# Patient Record
Sex: MALE | Race: WHITE | NOT HISPANIC OR LATINO | Employment: OTHER | URBAN - METROPOLITAN AREA
[De-identification: names, ages, dates, MRNs, and addresses within clinical notes are randomized per-mention and may not be internally consistent; named-entity substitution may affect disease eponyms.]

---

## 2023-11-14 ENCOUNTER — APPOINTMENT (OUTPATIENT)
Dept: LAB | Facility: CLINIC | Age: 78
End: 2023-11-14
Payer: MEDICARE

## 2023-11-14 DIAGNOSIS — C90.01 MULTIPLE MYELOMA IN REMISSION (HCC): ICD-10-CM

## 2023-11-14 LAB
ALBUMIN SERPL BCP-MCNC: 4.5 G/DL (ref 3.5–5)
ALP SERPL-CCNC: 71 U/L (ref 34–104)
ALT SERPL W P-5'-P-CCNC: 19 U/L (ref 7–52)
ANION GAP SERPL CALCULATED.3IONS-SCNC: 10 MMOL/L
AST SERPL W P-5'-P-CCNC: 16 U/L (ref 13–39)
BASOPHILS # BLD AUTO: 0.03 THOUSANDS/ÂΜL (ref 0–0.1)
BASOPHILS NFR BLD AUTO: 1 % (ref 0–1)
BILIRUB SERPL-MCNC: 0.59 MG/DL (ref 0.2–1)
BUN SERPL-MCNC: 23 MG/DL (ref 5–25)
CALCIUM SERPL-MCNC: 9.1 MG/DL (ref 8.4–10.2)
CHLORIDE SERPL-SCNC: 106 MMOL/L (ref 96–108)
CO2 SERPL-SCNC: 26 MMOL/L (ref 21–32)
CREAT SERPL-MCNC: 1.19 MG/DL (ref 0.6–1.3)
EOSINOPHIL # BLD AUTO: 0.06 THOUSAND/ÂΜL (ref 0–0.61)
EOSINOPHIL NFR BLD AUTO: 2 % (ref 0–6)
ERYTHROCYTE [DISTWIDTH] IN BLOOD BY AUTOMATED COUNT: 15.6 % (ref 11.6–15.1)
GFR SERPL CREATININE-BSD FRML MDRD: 58 ML/MIN/1.73SQ M
GLUCOSE SERPL-MCNC: 110 MG/DL (ref 65–140)
HCT VFR BLD AUTO: 43.3 % (ref 36.5–49.3)
HGB BLD-MCNC: 14.6 G/DL (ref 12–17)
IGA SERPL-MCNC: 11 MG/DL (ref 66–433)
IGG SERPL-MCNC: 237 MG/DL (ref 635–1741)
IGM SERPL-MCNC: <20 MG/DL (ref 45–281)
IMM GRANULOCYTES # BLD AUTO: 0.02 THOUSAND/UL (ref 0–0.2)
IMM GRANULOCYTES NFR BLD AUTO: 1 % (ref 0–2)
LDH SERPL-CCNC: 180 U/L (ref 140–271)
LYMPHOCYTES # BLD AUTO: 0.18 THOUSANDS/ÂΜL (ref 0.6–4.47)
LYMPHOCYTES NFR BLD AUTO: 6 % (ref 14–44)
MCH RBC QN AUTO: 34.2 PG (ref 26.8–34.3)
MCHC RBC AUTO-ENTMCNC: 33.7 G/DL (ref 31.4–37.4)
MCV RBC AUTO: 101 FL (ref 82–98)
MONOCYTES # BLD AUTO: 0.48 THOUSAND/ÂΜL (ref 0.17–1.22)
MONOCYTES NFR BLD AUTO: 15 % (ref 4–12)
NEUTROPHILS # BLD AUTO: 2.37 THOUSANDS/ÂΜL (ref 1.85–7.62)
NEUTS SEG NFR BLD AUTO: 75 % (ref 43–75)
NRBC BLD AUTO-RTO: 0 /100 WBCS
PLATELET # BLD AUTO: 113 THOUSANDS/UL (ref 149–390)
PMV BLD AUTO: 10.8 FL (ref 8.9–12.7)
POTASSIUM SERPL-SCNC: 4.1 MMOL/L (ref 3.5–5.3)
PROT SERPL-MCNC: 6 G/DL (ref 6.4–8.4)
RBC # BLD AUTO: 4.27 MILLION/UL (ref 3.88–5.62)
SODIUM SERPL-SCNC: 142 MMOL/L (ref 135–147)
URATE SERPL-MCNC: 6.8 MG/DL (ref 3.5–8.5)
WBC # BLD AUTO: 3.14 THOUSAND/UL (ref 4.31–10.16)

## 2023-11-14 PROCEDURE — 82784 ASSAY IGA/IGD/IGG/IGM EACH: CPT

## 2023-11-14 PROCEDURE — 36415 COLL VENOUS BLD VENIPUNCTURE: CPT

## 2023-11-14 PROCEDURE — 83615 LACTATE (LD) (LDH) ENZYME: CPT

## 2023-11-14 PROCEDURE — 83521 IG LIGHT CHAINS FREE EACH: CPT

## 2023-11-14 PROCEDURE — 80053 COMPREHEN METABOLIC PANEL: CPT

## 2023-11-14 PROCEDURE — 84165 PROTEIN E-PHORESIS SERUM: CPT

## 2023-11-14 PROCEDURE — 86334 IMMUNOFIX E-PHORESIS SERUM: CPT

## 2023-11-14 PROCEDURE — 84550 ASSAY OF BLOOD/URIC ACID: CPT

## 2023-11-14 PROCEDURE — 85025 COMPLETE CBC W/AUTO DIFF WBC: CPT

## 2023-11-16 LAB
ALBUMIN SERPL ELPH-MCNC: 4.38 G/DL (ref 3.2–5.1)
ALBUMIN SERPL ELPH-MCNC: 74.2 % (ref 48–70)
ALPHA1 GLOB SERPL ELPH-MCNC: 0.21 G/DL (ref 0.15–0.47)
ALPHA1 GLOB SERPL ELPH-MCNC: 3.5 % (ref 1.8–7)
ALPHA2 GLOB SERPL ELPH-MCNC: 0.48 G/DL (ref 0.42–1.04)
ALPHA2 GLOB SERPL ELPH-MCNC: 8.1 % (ref 5.9–14.9)
BETA GLOB ABNORMAL SERPL ELPH-MCNC: 0.4 G/DL (ref 0.31–0.57)
BETA1 GLOB SERPL ELPH-MCNC: 6.7 % (ref 4.7–7.7)
BETA2 GLOB SERPL ELPH-MCNC: 3.4 % (ref 3.1–7.9)
BETA2+GAMMA GLOB SERPL ELPH-MCNC: 0.2 G/DL (ref 0.2–0.58)
GAMMA GLOB ABNORMAL SERPL ELPH-MCNC: 0.24 G/DL (ref 0.4–1.66)
GAMMA GLOB SERPL ELPH-MCNC: 4.1 % (ref 6.9–22.3)
IGG/ALB SER: 2.88 {RATIO} (ref 1.1–1.8)
INTERPRETATION UR IFE-IMP: NORMAL
PROT PATTERN SERPL ELPH-IMP: ABNORMAL
PROT SERPL-MCNC: 5.9 G/DL (ref 6.4–8.2)

## 2023-11-16 PROCEDURE — 86334 IMMUNOFIX E-PHORESIS SERUM: CPT | Performed by: STUDENT IN AN ORGANIZED HEALTH CARE EDUCATION/TRAINING PROGRAM

## 2023-11-16 PROCEDURE — 84165 PROTEIN E-PHORESIS SERUM: CPT | Performed by: STUDENT IN AN ORGANIZED HEALTH CARE EDUCATION/TRAINING PROGRAM

## 2023-11-22 LAB
KAPPA LC FREE SER-MCNC: <0.7 MG/L (ref 3.3–19.4)
KAPPA LC FREE/LAMBDA FREE SER: ABNORMAL {RATIO}
LAMBDA LC FREE SERPL-MCNC: <1.5 MG/L (ref 5.7–26.3)

## 2024-04-03 ENCOUNTER — EVALUATION (OUTPATIENT)
Dept: PHYSICAL THERAPY | Facility: CLINIC | Age: 79
End: 2024-04-03
Payer: MEDICARE

## 2024-04-03 DIAGNOSIS — S22.008D OTHER FRACTURE OF UNSPECIFIED THORACIC VERTEBRA, SUBSEQUENT ENCOUNTER FOR FRACTURE WITH ROUTINE HEALING: ICD-10-CM

## 2024-04-03 DIAGNOSIS — C90.01 MULTIPLE MYELOMA IN REMISSION (HCC): Primary | ICD-10-CM

## 2024-04-03 PROCEDURE — 97110 THERAPEUTIC EXERCISES: CPT | Performed by: PHYSICAL THERAPIST

## 2024-04-03 PROCEDURE — 97161 PT EVAL LOW COMPLEX 20 MIN: CPT | Performed by: PHYSICAL THERAPIST

## 2024-04-03 NOTE — LETTER
April 3, 2024      No Recipients    Patient: Juve Livingston   YOB: 1945   Date of Visit: 4/3/2024     Encounter Diagnosis     ICD-10-CM    1. Multiple myeloma in remission (HCC)  C90.01       2. Other fracture of unspecified thoracic vertebra, subsequent encounter for fracture with routine healing  S22.008D           Dear Dr. Bull:    Thank you for your recent referral of Juve Livingston. Please review the attached evaluation summary from Juve's recent visit.     Please verify that you agree with the plan of care by signing the attached order.     If you have any questions or concerns, please do not hesitate to call.     I sincerely appreciate the opportunity to share in the care of one of your patients and hope to have another opportunity to work with you in the near future.       Sincerely,    Mary Alice Espinosa, PT      Referring Provider:      I certify that I have read the below Plan of Care and certify the need for these services furnished under this plan of treatment while under my care.                    Christopher Bull MD  [address] [place] [id number]  Via Fax: [phone]          PT Evaluation     Today's date: 4/3/2024  Patient name: Juve Livingston  : 1945  MRN: [id number]  Referring provider: Parmer, Lauren  Dx: No diagnosis found.               Assessment  Assessment details: 4/3/2024: Juve Livingston is a 78 y.o. male who presents 2 years post thoracic fusion with lumbar with pain, decreased strength, decreased ROM, decreased joint mobility, ambulatory dysfunction, postural dysfunction, balance dysfunction, and decreased endurance. Due to these impairments, patient has difficulty performing ADL's, recreational activities, engaging in social activities, ambulation, stair negotiation, lifting/carrying, transfers, reaching. Patient's clinical presentation is consistent with their referring diagnosis of . Patient has been educated in home exercise program and plan of care.  Patient would benefit from skilled physical therapy services to address their aforementioned functional limitations and progress towards prior level of function and independence with home exercise program and self symptom management/resolution.     Impairments: abnormal or restricted ROM, activity intolerance, impaired balance, impaired physical strength, lacks appropriate home exercise program, pain with function, poor posture  and poor body mechanics  Other impairment: poor tolerance to prolonged static positions  Understanding of Dx/Px/POC: good   Prognosis: good    Goals  Short Term Goals:  Target Date ***  1. Initiate and advance HEP toward self symptom reduction/resolution.  2. Improve postural awareness and demonstrate self postural correction.  3. Improve AROM lumbar spine to min robertson or better t/o.  4. Undisturbed sleep.  5. Pt to tolerate prolonged sitting/standing x 1 hour or more w/o c/o.      Long Term Goals:  Target Date ***  1. Indep with HEP and self symptom prevention.  2. Achieve lumbar AROM to WNL w/o c/o.  3. Improve LE/core strength to good to allow pt to tolerate bending and lifting/carrying 20# x 10/1' w/o c/o.      Plan  Patient would benefit from: skilled PT and PT eval  Planned modality interventions: thermotherapy: hydrocollator packs and unattended electrical stimulation  Planned therapy interventions: joint mobilization, patient education, postural training, abdominal trunk stabilization, functional ROM exercises, home exercise program, neuromuscular re-education, strengthening, stretching, therapeutic activities and therapeutic exercise  Other planned therapy interventions: mechanical assessment  Frequency: 2x week  Duration in weeks: 8  Treatment plan discussed with: patient    Subjective Evaluation    History of Present Illness  Mechanism of injury: Subjective 4/3/2024: Pt reports to PT for general deconditioning, imbalance, LE weakness and also reports some back pain. He had a  compression fx at T3 and subsequently had thoracic fusion surgery T2-T6 in 2021. He was sent to rehab, but returned to the hospital due to pneumonia. He was soon after dx w/ melanoma. He underwent treatment for this and is currently in remission. He never completed rehab after his back surgery, citing multiple bouts of pneumonia, Covid and cancer treatments.   He c/o low back pain now, occurs w/ rising from prolonged sitting or standing; better as he walks. No UE or LE symptoms.   He c/o leg crams at night.   Pt has been using rollator walker for ambulation outside of his home since his back surgery. In his home, he ambulates w/o AD and denies holding onto furniture.  Patient Goals  Patient goals for therapy: increased strength, decreased pain, independence with ADLs/IADLs and return to sport/leisure activities  Patient goal: less back pain; stronger legs  Pain  At best pain ratin  At worst pain ratin  Quality: dull ache and tight    Treatments  Current treatment: physical therapy      Objective      POSTURE:  *** standing posture demonstrates *** lumbar lordosis; there is *** lateral shift   Posture correction in stting *** sympotoms    SPECIAL TESTS     ***  SLR supine:   (*** R/*** L)  Crossed SLR:   (*** R/*** L)  Prone instability test:  (***)  Prone hip IR ROM:  (***R/***L)  Aberrant motion/[place]'s: (***)  Supine to sit test:  (***)  [place] test prone:  (***)  Slump test:   (***R/***L)  Femoral NTT:   (***R/***L)      DERMATOMAL TESTING:     ***  L2 anterior thigh:  ***  L3 medial knee:  ***  L4 medial maleolus:  ***  L5 1st web space:  ***  S1 lateral/sole foot:  ***  S2 poster calf:  ***    MYOTOMAL TESTIN/3/2024 4/3/2024     Right  Left  L2-3 Hip flexion:    4+/5  L3-4 Knee extension:    4+/5  L5 Great toe exten:    5/5  L4 Heel walk/DF:    5/5  S1 toe walk/PF/ever:    4/  S2 knee flex:     4  Hip ER   4+/5  4+/5  Hip IR   4+/5  4+/5  Hip abd    45  Hip  exten  3-/5  3+/5    REFLEXES: 0= none; 1+ = slight; 2+ = brisk/normal; 3+= very brisk; 4+= clonus     ***  L3-4 Quadriceps:  ***  L5-S1 Achilles:  ***    SPINAL/PIAVM ASSESSMENT/PALPATION:   ***: Lumbar P to A segmental mobility *** limited     LUMBAR AROM: ***  Flexion   *** robertson  Extension  *** robertson  R sideglide  *** robertson  L sideglide  *** robertson    MECHANICAL ASSESSMENT:   ***: pre-test findings include  Repeated extension in lying (REIL) 2x10 = ***  Repeated flexion in sitting 2x10 = ***    FUNCTION:  ***: Pt is limited with ***    FLEXIBILITY:  ***: Hamstring flexibility tera robertson B/L 55 deg         Quad/hip flexor flexibitlity *** robertson ***        Pirif flexibility *** robertson ***    105/113  120/124   +2/*+4 0/-4    Outcome Measures Initial Eval  4/3/2024        5xSTS 35 sec minor use of hands        TUG 14.67 sec no AD        10 meter NT sec =  NT m/s        CAMPOS NT/56        FGA NT/30        mCTSIB  - FTEO (firm)  - FTEC (firm)  - FTEO (foam)  - FTEC (foam)   60+ sec  8 sec  60+ sec  3 sec        6MWT NT meters in NT min        SLS EO 1 sec R/L                      Cut off scores:  All data taken from [employer] Neuro Section or Rehab Measures    Campos: <46/56=falls risk  MDC: 6 pts  Age Norms:  60-69: M - 55   F - 55  70-79: M - 54   F - 53  80-89: M - 53   F - 50 5xSTS: Natan et al 2010  MDC: 2.3 sec  Age Norms:  60-69: 11.1 sec  70-79: 12.6 sec  80-89: 14.8 sec   TUG  MDC: 4.14 sec  Cut off score:  >13.5 sec community dwelling adults  >32.2 sec frail elderly  <20 sec: I for basic transfers  >30: dependent for transfers 10 Meter Walk Test Norms: Ashley et al 2011  20-29: M - 1.35 m   F - 1.34 m  30-39: M - 1.43 m   F - 1.34 m  40-49: M - 1.43 m   F - 1.39 m  50-59: M - 1.43 m   F - 1.31 m  60-69: M - 1.34 m   F - 1.24 m  70-79: M - 1.26 m   F - 1.13 m  80-89: M - 0.97 m   F - 0.94 m   FGA  MCID: 4 pts  Geriatrics/community < 22/30 fall risk  Geriatrics/community < 20/30 unexplained falls DGI  MDC: vestibular - 4  "pts  MDC: geriatric/community - 3 pts  Falls risk <19/24   6 Minute Walk Test  Age Norms  60-69: M - 1876 ft (571.80 m)  F - 1765 ft (537.98 m)  70-79: M - 1729 ft (527.00 m)  F - 1545 ft (470.92 m)  80-89: M - 1368 ft (416.97 m)  F - 1286 ft (391.97 m) mCTSIB  Norm: 20-60 yrs  Eyes open firm: norm sway 0.21-0.48  Eyes closed firm: norm sway 0.48-0.99  Eyes open foam: norm sway 0.38-0.71  Eyes closed foam: norm sway 0.70-2.22         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  Access Code: [email or web address]  URL: [email or web address]  Date: 04/03/2024  Prepared by: Mary Alice Espinosa    Exercises  - Supine Bridge  - 1 x daily - 2 sets - 5 reps  - Modified Kenroy Stretch  - 1 x daily - 2 reps - 1 min hold  - Hooklying Hamstring Stretch with Strap  - 1 x daily - 30 reps - 2 hold  - Standing Hip Abduction with Resistance at Ankles and Counter Support  - 1 x daily - 10 reps  - Standing Hip Extension with Resistance at Ankles and Unilateral Counter Support  - 1 x daily - 7 x weekly - 10 reps    SOC: 4/3/2024  FOTO: 4/3/2024  POC EXP: 6/26/2024  DAILY TREATMENT LOG  date 4/3/2024       Visit #/auth 1       manual                        Neuro Re-Ed        FGA or CAMPOS Next visit *      Alt cone taps        Tandem walk        Retro walk        FT W/ EC        FT on foam w/ horiz HT                Ther Ex 15'               Sup ham stretch w/ SOS 30\"x2 R/L       Hip flexor stretch off edge 1'x1 R/L               Hip abd in SL R/L                Stand alt hip abd vs TB RTB @ knees 1x10 R/L       Stand alt hip exten RTB @ knees 1x10 R/L       HEP Issued & reviewed       Ther Activity        bridges 2x5       STS        Fwd step ups        Gait Training                        Modalities                                                "

## 2024-04-03 NOTE — PROGRESS NOTES
PT Evaluation     Today's date: 4/3/2024  Patient name: Juve Livingston  : 1945  MRN: [id number]  Referring provider: Christopher Bull MD  Dx:   Encounter Diagnosis     ICD-10-CM    1. Multiple myeloma in remission (HCC)  C90.01       2. Other fracture of unspecified thoracic vertebra, subsequent encounter for fracture with routine healing  S22.008D           Start Time: 1100  Stop Time: 1145  Total time in clinic (min): 45 minutes    Assessment  Assessment details: 4/3/2024: Juve Livingston is a 78 y.o. male who presents 2 years post thoracic fusion with lumbar with pain, decreased strength, decreased ROM, decreased joint mobility, ambulatory dysfunction, postural dysfunction, balance dysfunction, and decreased endurance. Due to these impairments, patient has difficulty performing ADL's, recreational activities, engaging in social activities, ambulation, stair negotiation, lifting/carrying, transfers, reaching. Patient's clinical presentation is consistent with their referring diagnosis of thoracic compression fx (s/p fusion) and general deconditioning.   Patient has been educated in home exercise program and plan of care. Patient would benefit from skilled physical therapy services to address their aforementioned functional limitations and progress towards prior level of function and independence with home exercise program and self symptom management/resolution.     Impairments: abnormal or restricted ROM, activity intolerance, impaired balance, impaired physical strength, lacks appropriate home exercise program, pain with function, poor posture  and poor body mechanics  Other impairment: poor tolerance to prolonged static positions  Understanding of Dx/Px/POC: good   Prognosis: good    Goals  Short Term Goals:  Target Date 2024  1. Initiate and advance HEP toward self symptom reduction/resolution.  2. Improve hip flexor flexibility to allow pt to stand fully upright and to report improved static  standing tolerance to >/=10 min w/ 0-2/10 LBP.  3. Improve hamstring flexibility to 65 deg or better B/L to allow lumbar flexion to min robertson or better.  4. Improve LE strength such that pt can complete 5xSTS w/o need for UE assist.  5. Pt to report at least 50% reduction in pain upon raising from prolonged sitting.    Long Term Goals:  Target Date 6/26/2024  1. Indep with HEP and self symptom prevention.  2. Improve ABC scale (w/ RW) to >/=75% confidence.  3. Improve LE strength to >/= 4/5 to allow ease w/ bridges, bed mobility and achieve 5xSTS w/o UE assist in 22 sec or less.  4. Improve balance such that pt can maintain SLS w/ EO x 5 sec or more R/L and achieve FGA score of 24/30 or better.  5. Pt to be able to ambulate community distances w/ SPC or no AD.       Plan  Patient would benefit from: skilled PT and PT eval  Planned modality interventions: thermotherapy: hydrocollator packs and unattended electrical stimulation  Planned therapy interventions: joint mobilization, patient education, postural training, abdominal trunk stabilization, functional ROM exercises, home exercise program, neuromuscular re-education, strengthening, stretching, therapeutic activities and therapeutic exercise  Other planned therapy interventions: mechanical assessment  Frequency: 2x week  Duration in weeks: 8  Treatment plan discussed with: patient      Subjective Evaluation    History of Present Illness  Mechanism of injury: Subjective 4/3/2024: Pt reports to PT for general deconditioning, imbalance, LE weakness and also reports some back pain. He had a compression fx at T3 and subsequently had thoracic fusion surgery T2-T6 in September 2021. He was sent to rehab, but returned to the hospital due to pneumonia. He was soon after dx w/ melanoma. He underwent treatment for this and is currently in remission. He never completed rehab after his back surgery, citing multiple bouts of pneumonia, Covid and cancer treatments.   He c/o low  back pain now, occurs w/ rising from prolonged sitting or standing; better as he walks. No UE or LE symptoms.   He c/o leg crams at night.   Pt has been using rollator walker for ambulation outside of his home since his back surgery. In his home, he ambulates w/o AD and denies holding onto furniture.  He arrives to PT w/ Rx from his surgeon Dr Ovalle for Thoracic fx rehab and from Oncologist Dr Wright for general conditioning. He states he will follow up w/ oncologist, but may not follow up w/ ortho surgeon.   Patient Goals  Patient goals for therapy: increased strength, decreased pain, independence with ADLs/IADLs and return to sport/leisure activities  Patient goal: less back pain; stronger legs  Pain  At best pain ratin  At worst pain ratin  Quality: dull ache and tight    Treatments  Current treatment: physical therapy        Objective      MYOTOMAL TESTIN/3/2024 4/3/2024     Right  Left  L2-3 Hip flexion:    4+/5  L3-4 Knee extension:    4+/5  L5 Great toe exten:    5/5  L4 Heel walk/DF:    5/5  S1 toe walk/PF/ever:  /  S2 knee flex:     4/5  Hip ER   4+/5  4+/5  Hip IR   4+/5  4+/5  Hip abd    4/5  Hip exten  3-/5  3+/5    LUMBAR AROM: 4/3/2024  Flexion   Mod robertson (FT to mid shin)  Extension  onel 90% robertson  R sideglide  Mod 60% robertson  L sideglide  Onel 80% robertson      FUNCTION:  4/3/2024: Pt is limited with ambulation w/o AD - uses RW for outings for stability since thor fusion 2 years ago   Mod difficulty w/ bridges/bed mobility   LBP w/ getting up from prolonged sitting and w/ static standing > 5 min   Quad lag B/L (knee ext qset/SLR): R +2/-4; L 0/-4   Demonstrates flexed posture in standing (hip flexor tightness)   Limited forward bending due to hamstring tightness  FLEXIBILITY:  4/3/2024: Hamstring flexibility onel robertson B/L 55 deg         Quad/hip flexor flexibitlity mod robertson R/min robertson L -  knee flex sup/prone R- 105/113; L 120/124        Pirif flexibility mod/onel marcelo B/L        Outcome Measures Initial Eval  4/3/2024        5xSTS 35 sec minor use of hands        TUG 14.67 sec no AD        10 meter NT sec =  NT m/s        CAMPOS NT/56        FGA 13/30 4/5/2024        mCTSIB  - FTEO (firm)  - FTEC (firm)  - FTEO (foam)  - FTEC (foam)   60+ sec  8 sec  60+ sec  3 sec        6MWT NT meters in NT min        SLS EO 1 sec R/L        ABC Scale 58.13% w/ Rollator             Cut off scores:  All data taken from [employer] Neuro Section or Rehab Measures    Campos: <46/56=falls risk  MDC: 6 pts  Age Norms:  60-69: M - 55   F - 55  70-79: M - 54   F - 53  80-89: M - 53   F - 50 5xSTS: Natan et al 2010  MDC: 2.3 sec  Age Norms:  60-69: 11.1 sec  70-79: 12.6 sec  80-89: 14.8 sec   TUG  MDC: 4.14 sec  Cut off score:  >13.5 sec community dwelling adults  >32.2 sec frail elderly  <20 sec: I for basic transfers  >30: dependent for transfers 10 Meter Walk Test Norms: Julieta and Raffy et al 2011  20-29: M - 1.35 m   F - 1.34 m  30-39: M - 1.43 m   F - 1.34 m  40-49: M - 1.43 m   F - 1.39 m  50-59: M - 1.43 m   F - 1.31 m  60-69: M - 1.34 m   F - 1.24 m  70-79: M - 1.26 m   F - 1.13 m  80-89: M - 0.97 m   F - 0.94 m   [facility]  MCID: 4 pts  Geriatrics/community < 22/30 fall risk  Geriatrics/community < 20/30 unexplained falls DGI  MDC: vestibular - 4 pts  MDC: geriatric/community - 3 pts  Falls risk <19/24   6 Minute Walk Test  Age Norms  60-69: M - 1876 ft (571.80 m)  F - 1765 ft (537.98 m)  70-79: M - 1729 ft (527.00 m)  F - 1545 ft (470.92 m)  80-89: M - 1368 ft (416.97 m)  F - 1286 ft (391.97 m) mCTSIB  Norm: 20-60 yrs  Eyes open firm: norm sway 0.21-0.48  Eyes closed firm: norm sway 0.48-0.99  Eyes open foam: norm sway 0.38-0.71  Eyes closed foam: norm sway 0.70-2.22         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  Access Code: [email or web address]  URL: [email or web address]  Date: 04/03/2024  Prepared by: Mary Alice Espinosa    Exercises  - Supine Bridge  - 1 x daily - 2 sets - 5 reps  - Modified Ken[place]  "Stretch  - 1 x daily - 2 reps - 1 min hold  - Hooklying Hamstring Stretch with Strap  - 1 x daily - 30 reps - 2 hold  - Standing Hip Abduction with Resistance at Ankles and Counter Support  - 1 x daily - 10 reps  - Standing Hip Extension with Resistance at Ankles and Unilateral Counter Support  - 1 x daily - 7 x weekly - 10 reps    SOC: 4/3/2024  FOTO: 4/3/2024  POC EXP: 6/26/2024  DAILY TREATMENT LOG  date 4/3/2024       Visit #/auth 1       manual                        Neuro Re-Ed        FGA or CAMPOS Next visit *      Alt cone taps        Tandem walk        Retro walk        FT W/ EC        FT on foam w/ horiz HT                Ther Ex 15'               Sup ham stretch w/ SOS 30\"x2 R/L       Hip flexor stretch off edge 1'x1 R/L               Hip abd in SL R/L                Stand alt hip abd vs TB RTB @ knees 1x10 R/L       Stand alt hip exten RTB @ knees 1x10 R/L       HEP Issued & reviewed       Ther Activity        bridges 2x5       STS        Fwd step ups        Gait Training                        Modalities                             "

## 2024-04-03 NOTE — LETTER
2024    Christopher Bull MD  [address] [place] [id number]    Patient: Juve Livingston   YOB: 1945   Date of Visit: 4/3/2024     Encounter Diagnosis     ICD-10-CM    1. Multiple myeloma in remission (HCC)  C90.01       2. Other fracture of unspecified thoracic vertebra, subsequent encounter for fracture with routine healing  S22.008D           Dear Dr. Bull:    Thank you for your recent referral of Juve Livingston. Please review the attached evaluation summary from Juve's recent visit.     Please verify that you agree with the plan of care by signing the attached order.     If you have any questions or concerns, please do not hesitate to call.     I sincerely appreciate the opportunity to share in the care of one of your patients and hope to have another opportunity to work with you in the near future.       Sincerely,    Mary Alice Espinosa, PT      Referring Provider:      I certify that I have read the below Plan of Care and certify the need for these services furnished under this plan of treatment while under my care.                    Christopher Bull MD  [address] [place] [id number]  Via Fax: [phone]          PT Evaluation     Today's date: 4/3/2024  Patient name: Juve Livingston  : 1945  MRN: [id number]  Referring provider: Christopher Bull MD  Dx:   Encounter Diagnosis     ICD-10-CM    1. Multiple myeloma in remission (HCC)  C90.01       2. Other fracture of unspecified thoracic vertebra, subsequent encounter for fracture with routine healing  S22.008D           Start Time: 1100  Stop Time: 1145  Total time in clinic (min): 45 minutes    Assessment  Assessment details: 4/3/2024: Juve Livingston is a 78 y.o. male who presents 2 years post thoracic fusion with lumbar with pain, decreased strength, decreased ROM, decreased joint mobility, ambulatory dysfunction, postural dysfunction, balance dysfunction, and decreased endurance. Due to these impairments, patient has  difficulty performing ADL's, recreational activities, engaging in social activities, ambulation, stair negotiation, lifting/carrying, transfers, reaching. Patient's clinical presentation is consistent with their referring diagnosis of thoracic compression fx (s/p fusion) and general deconditioning.   Patient has been educated in home exercise program and plan of care. Patient would benefit from skilled physical therapy services to address their aforementioned functional limitations and progress towards prior level of function and independence with home exercise program and self symptom management/resolution.     Impairments: abnormal or restricted ROM, activity intolerance, impaired balance, impaired physical strength, lacks appropriate home exercise program, pain with function, poor posture  and poor body mechanics  Other impairment: poor tolerance to prolonged static positions  Understanding of Dx/Px/POC: good   Prognosis: good    Goals  Short Term Goals:  Target Date 5/1/2024  1. Initiate and advance HEP toward self symptom reduction/resolution.  2. Improve hip flexor flexibility to allow pt to stand fully upright and to report improved static standing tolerance to >/=10 min w/ 0-2/10 LBP.  3. Improve hamstring flexibility to 65 deg or better B/L to allow lumbar flexion to min robertson or better.  4. Improve LE strength such that pt can complete 5xSTS w/o need for UE assist.  5. Pt to report at least 50% reduction in pain upon raising from prolonged sitting.    Long Term Goals:  Target Date 6/26/2024  1. Indep with HEP and self symptom prevention.  2. Improve ABC scale (w/ RW) to >/=75% confidence.  3. Improve LE strength to >/= 4/5 to allow ease w/ bridges, bed mobility and achieve 5xSTS w/o UE assist in 22 sec or less.  4. Improve balance such that pt can maintain SLS w/ EO x 5 sec or more R/L and achieve FGA score of 24/30 or better.  5. Pt to be able to ambulate community distances w/ SPC or no AD.        Plan  Patient would benefit from: skilled PT and PT eval  Planned modality interventions: thermotherapy: hydrocollator packs and unattended electrical stimulation  Planned therapy interventions: joint mobilization, patient education, postural training, abdominal trunk stabilization, functional ROM exercises, home exercise program, neuromuscular re-education, strengthening, stretching, therapeutic activities and therapeutic exercise  Other planned therapy interventions: mechanical assessment  Frequency: 2x week  Duration in weeks: 8  Treatment plan discussed with: patient      Subjective Evaluation    History of Present Illness  Mechanism of injury: Subjective 4/3/2024: Pt reports to PT for general deconditioning, imbalance, LE weakness and also reports some back pain. He had a compression fx at T3 and subsequently had thoracic fusion surgery T2-T6 in 2021. He was sent to rehab, but returned to the hospital due to pneumonia. He was soon after dx w/ melanoma. He underwent treatment for this and is currently in remission. He never completed rehab after his back surgery, citing multiple bouts of pneumonia, Covid and cancer treatments.   He c/o low back pain now, occurs w/ rising from prolonged sitting or standing; better as he walks. No UE or LE symptoms.   He c/o leg crams at night.   Pt has been using rollator walker for ambulation outside of his home since his back surgery. In his home, he ambulates w/o AD and denies holding onto furniture.  He arrives to PT w/ Rx from his surgeon Dr Ovalle for Thoracic fx rehab and from Oncologist Dr Wright for general conditioning. He states he will follow up w/ oncologist, but may not follow up w/ ortho surgeon.   Patient Goals  Patient goals for therapy: increased strength, decreased pain, independence with ADLs/IADLs and return to sport/leisure activities  Patient goal: less back pain; stronger legs  Pain  At best pain ratin  At worst pain rating:  7  Quality: dull ache and tight    Treatments  Current treatment: physical therapy        Objective      MYOTOMAL TESTIN/3/2024 4/3/2024     Right  Left  L2-3 Hip flexion:  5/  4+/5  L3-4 Knee extension:    4+/5  L5 Great toe exten:    5/5  L4 Heel walk/DF:  5/  5/5  S1 toe walk/PF/ever:    4/  S2 knee flex:     4/  Hip ER   4+/5  4+/5  Hip IR   4+/  4+/5  Hip abd    4  Hip exten  3-/5  3+/5    LUMBAR AROM: 4/3/2024  Flexion   Mod robertson (FT to mid shin)  Extension  onel 90% robertson  R sideglide  Mod 60% robertson  L sideglide  Onel 80% robertson      FUNCTION:  4/3/2024: Pt is limited with ambulation w/o AD - uses RW for outings for stability since thor fusion 2 years ago   Mod difficulty w/ bridges/bed mobility   LBP w/ getting up from prolonged sitting and w/ static standing > 5 min   Quad lag B/L (knee ext qset/SLR): R +2/-4; L 0/-4   Demonstrates flexed posture in standing (hip flexor tightness)   Limited forward bending due to hamstring tightness  FLEXIBILITY:  4/3/2024: Hamstring flexibility onel robertson B/L 55 deg         Quad/hip flexor flexibitlity mod robertson R/min robertson L -  knee flex sup/prone R- 105/113; L 120/124        Pirif flexibility mod/onel robertson B/L       Outcome Measures Initial Eval  4/3/2024        5xSTS 35 sec minor use of hands        TUG 14.67 sec no AD        10 meter NT sec =  NT m/s        CAMPOS NT/56        FGA NT/30        mCTSIB  - FTEO (firm)  - FTEC (firm)  - FTEO (foam)  - FTEC (foam)   60+ sec  8 sec  60+ sec  3 sec        6MWT NT meters in NT min        SLS EO 1 sec R/L        ABC Scale 58.13% w/ Rollator             Cut off scores:  All data taken from [facility] or Rehab Measures    Campos: <46/56=falls risk  MDC: 6 pts  Age Norms:  60-69: M - 55   F - 55  70-79: M - 54   F - 53  80-89: M - 53   F - 50 5xSTS: Natan sher al 2010  MDC: 2.3 sec  Age Norms:  60-69: 11.1 sec  70-79: 12.6 sec  80-89: 14.8 sec   TUG  MDC: 4.14 sec  Cut off score:  >13.5 sec [facility]  "adults  >32.2 sec frail elderly  <20 sec: I for basic transfers  >30: dependent for transfers 10 Meter Walk Test Norms: Julieta and Raffy et al 2011  20-29: M - 1.35 m   F - 1.34 m  30-39: M - 1.43 m   F - 1.34 m  40-49: M - 1.43 m   F - 1.39 m  50-59: M - 1.43 m   F - 1.31 m  60-69: M - 1.34 m   F - 1.24 m  70-79: M - 1.26 m   F - 1.13 m  80-89: M - 0.97 m   F - 0.94 m   FGA  MCID: 4 pts  Geriatrics/community < 22/30 fall risk  Geriatrics/community < 20/30 unexplained falls DGI  MDC: vestibular - 4 pts  MDC: geriatric/community - 3 pts  Falls risk <19/24   6 Minute Walk Test  Age Norms  60-69: M - 1876 ft (571.80 m)  F - 1765 ft (537.98 m)  70-79: M - 1729 ft (527.00 m)  F - 1545 ft (470.92 m)  80-89: M - 1368 ft (416.97 m)  F - 1286 ft (391.97 m) mCTSIB  Norm: 20-60 yrs  Eyes open firm: norm sway 0.21-0.48  Eyes closed firm: norm sway 0.48-0.99  Eyes open foam: norm sway 0.38-0.71  Eyes closed foam: norm sway 0.70-2.22         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  Access Code: [email or web address]  URL: [email or web address]  Date: 04/03/2024  Prepared by: Mary Alice Espinosa    Exercises  - Supine Bridge  - 1 x daily - 2 sets - 5 reps  - Modified Kenroy Stretch  - 1 x daily - 2 reps - 1 min hold  - Hooklying Hamstring Stretch with Strap  - 1 x daily - 30 reps - 2 hold  - Standing Hip Abduction with Resistance at Ankles and Counter Support  - 1 x daily - 10 reps  - Standing Hip Extension with Resistance at Ankles and Unilateral Counter Support  - 1 x daily - 7 x weekly - 10 reps    SOC: 4/3/2024  FOTO: 4/3/2024  POC EXP: 6/26/2024  DAILY TREATMENT LOG  date 4/3/2024       Visit #/auth 1       manual                        Neuro Re-Ed        ASHISHA or ANDRES Next visit *      Alt cone taps        Tandem walk        Retro walk        FT W/ EC        FT on foam w/ horiz HT                Ther Ex 15'               Sup ham stretch w/ SOS 30\"x2 R/L       Hip flexor stretch off edge 1'x1 R/L               Hip abd " in SL R/L                Stand alt hip abd vs TB RTB @ knees 1x10 R/L       Stand alt hip exten RTB @ knees 1x10 R/L       HEP Issued & reviewed       Ther Activity        bridges 2x5       STS        Fwd step ups        Gait Training                        Modalities

## 2024-04-05 ENCOUNTER — OFFICE VISIT (OUTPATIENT)
Dept: PHYSICAL THERAPY | Facility: CLINIC | Age: 79
End: 2024-04-05
Payer: MEDICARE

## 2024-04-05 DIAGNOSIS — S22.008D OTHER FRACTURE OF UNSPECIFIED THORACIC VERTEBRA, SUBSEQUENT ENCOUNTER FOR FRACTURE WITH ROUTINE HEALING: ICD-10-CM

## 2024-04-05 DIAGNOSIS — C90.01 MULTIPLE MYELOMA IN REMISSION (HCC): Primary | ICD-10-CM

## 2024-04-05 PROCEDURE — 97110 THERAPEUTIC EXERCISES: CPT | Performed by: PHYSICAL THERAPIST

## 2024-04-05 PROCEDURE — 97112 NEUROMUSCULAR REEDUCATION: CPT | Performed by: PHYSICAL THERAPIST

## 2024-04-05 PROCEDURE — 97530 THERAPEUTIC ACTIVITIES: CPT | Performed by: PHYSICAL THERAPIST

## 2024-04-05 RX ORDER — SULFAMETHOXAZOLE AND TRIMETHOPRIM 800; 160 MG/1; MG/1
1 TABLET ORAL 3 TIMES WEEKLY
COMMUNITY

## 2024-04-05 RX ORDER — DOCUSATE SODIUM 100 MG/1
100 CAPSULE, LIQUID FILLED ORAL
COMMUNITY

## 2024-04-05 RX ORDER — FLUTICASONE FUROATE, UMECLIDINIUM BROMIDE AND VILANTEROL TRIFENATATE 100; 62.5; 25 UG/1; UG/1; UG/1
1 POWDER RESPIRATORY (INHALATION) DAILY
COMMUNITY

## 2024-04-05 RX ORDER — ARIPIPRAZOLE 2 MG/1
2 TABLET ORAL DAILY
COMMUNITY
End: 2024-04-12

## 2024-04-05 RX ORDER — ACYCLOVIR 400 MG/1
400 TABLET ORAL 2 TIMES DAILY
COMMUNITY

## 2024-04-05 RX ORDER — PANTOPRAZOLE SODIUM 40 MG/1
40 TABLET, DELAYED RELEASE ORAL DAILY
COMMUNITY

## 2024-04-05 RX ORDER — MIRTAZAPINE 45 MG/1
45 TABLET, ORALLY DISINTEGRATING ORAL
COMMUNITY

## 2024-04-05 NOTE — PROGRESS NOTES
"Daily Note     Today's date: 2024  Patient name: Juve Livingston  : 1945  MRN: [id number]  Referring provider: Parmer, Lauren  Dx:   Encounter Diagnosis     ICD-10-CM    1. Multiple myeloma in remission (HCC)  C90.01       2. Other fracture of unspecified thoracic vertebra, subsequent encounter for fracture with routine healing  S22.008D                      Subjective: Pt reports feeling good after his first session. He has been doing his HEP thus far.      Objective: See treatment diary below; pt's wife brought in list of current meds and medication allergies. This information was updated today in episode tab.  Completed FGA which we did not have time for during his IE.    Assessment: Tolerated treatment well and demonstrates imbalance, limited endurance, LE flexibility deficits and prox hip weakness . Patient demonstrated fatigue post treatment, would benefit from continued PT, and appears and expresses motivation to improve.       Plan: Progress treatment as tolerated.       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  Access Code: [email or web address]  URL: [email or web address]  Date: 2024  Prepared by: Mary Alice Espinosa    Exercises  - Supine Bridge  - 1 x daily - 2 sets - 5 reps  - Modified Kenroy Stretch  - 1 x daily - 2 reps - 1 min hold  - Hooklying Hamstring Stretch with Strap  - 1 x daily - 30 reps - 2 hold  - Standing Hip Abduction with Resistance at Ankles and Counter Support  - 1 x daily - 10 reps  - Standing Hip Extension with Resistance at Ankles and Unilateral Counter Support  - 1 x daily - 7 x weekly - 10 reps    SOC: 4/3/2024  FOTO: 4/3/2024  POC EXP: 2024  DAILY TREATMENT LOG  date 4/3/2024 2024      Visit #/auth 1 2      manual                        Neuro Re-Ed  15'      FGA or CAMPOS Next visit       Alt taps to step  6\" step 1x5 R/L CGA      Tandem walk        Retro walk        FT W/ EC        FT on foam w/ horiz HT  1x10 R/L CGA      Low mat STS w/ feet " "on 2\" foam  1x10 24\" table CGA      Ther Ex 15' 20'              Sup ham stretch w/ SOS 30\"x2 R/L 30\"x2 R/L      Hip flexor stretch off edge 1'x1 R/L 1'x1 R/L              Hip abd in SL R/L  1x10 R/L      Rockerboard PD w/ rail for B/L gastroc stretch stand at rail  1x10 CS      Stand alt hip abd vs TB RTB @ knees 1x10 R/L RTB @ knees 1x10 R/L      Stand alt hip exten RTB @ knees 1x10 R/L RTB R/L 1x10 R/L      HEP Issued & reviewed       Ther Activity  10'      bridges 2x5 2x10              Fwd step ups  6\" step 2x5 R/L w/ rail and CS      Gait Training                        Modalities                             "

## 2024-04-08 ENCOUNTER — OFFICE VISIT (OUTPATIENT)
Dept: PHYSICAL THERAPY | Facility: CLINIC | Age: 79
End: 2024-04-08
Payer: MEDICARE

## 2024-04-08 DIAGNOSIS — S22.008D OTHER FRACTURE OF UNSPECIFIED THORACIC VERTEBRA, SUBSEQUENT ENCOUNTER FOR FRACTURE WITH ROUTINE HEALING: ICD-10-CM

## 2024-04-08 DIAGNOSIS — C90.01 MULTIPLE MYELOMA IN REMISSION (HCC): Primary | ICD-10-CM

## 2024-04-08 PROCEDURE — 97530 THERAPEUTIC ACTIVITIES: CPT | Performed by: PHYSICAL THERAPIST

## 2024-04-08 PROCEDURE — 97112 NEUROMUSCULAR REEDUCATION: CPT | Performed by: PHYSICAL THERAPIST

## 2024-04-08 PROCEDURE — 97110 THERAPEUTIC EXERCISES: CPT | Performed by: PHYSICAL THERAPIST

## 2024-04-08 NOTE — PROGRESS NOTES
"Daily Note     Today's date: 2024  Patient name: Juve Livingston  : 1945  MRN: [id number]  Referring provider: Parmer, Lauren  Dx:   Encounter Diagnosis     ICD-10-CM    1. Multiple myeloma in remission (HCC)  C90.01       2. Other fracture of unspecified thoracic vertebra, subsequent encounter for fracture with routine healing  S22.008D                      Subjective: Pt reports he feels like his left side is stronger than his right. He has been doing his HEP. He was a little tired after last visit, but was fine.      Objective: See treatment diary below; pt cont's w/ rollator for ambulation.      Assessment: Tolerated treatment well and was able to advance repetitions . Patient demonstrated fatigue post treatment and would benefit from continued PT      Plan: Progress treatment as tolerated.       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  Access Code: [email or web address]  URL: [email or web address]  Date: 2024  Prepared by: Mary Alice Espinosa    Exercises  - Supine Bridge  - 1 x daily - 2 sets - 5 reps  - Modified Kenroy Stretch  - 1 x daily - 2 reps - 1 min hold  - Hooklying Hamstring Stretch with Strap  - 1 x daily - 30 reps - 2 hold  - Standing Hip Abduction with Resistance at Ankles and Counter Support  - 1 x daily - 10 reps  - Standing Hip Extension with Resistance at Ankles and Unilateral Counter Support  - 1 x daily - 7 x weekly - 10 reps    SOC: 4/3/2024  FOTO: 4/3/2024  POC EXP: 2024  DAILY TREATMENT LOG  date 4/3/2024 2024 2024     Visit #/auth 1 2 3     manual   5'     Hip flexor stretch in SL   20\"x2 R/L in btwn sets of SLR abd             Neuro Re-Ed  15' 10'     FGA or CAMPOS Next visit       Alt taps to step  6\" step 1x5 R/L CGA 6\" step 1x10 R/L CGA     Tandem walk        Retro walk        FT W/ EC        FT on foam w/ horiz HT  1x10 R/L CGA 1x10 R/L CGA     Low mat STS w/ feet on 2\" foam  1x10 24\" table CGA 2x10 CS 23\" table height; CGA     Ther Ex 15' 20' " "20'             Sup ham stretch w/ SOS 30\"x2 R/L 30\"x2 R/L 30\"x2 R/L     Hip flexor stretch off edge 1'x1 R/L 1'x1 R/L 1'x1 R/L             Hip abd in SL R/L  1x10 R/L 2x10 R/L     Rockerboard PD w/ rail for B/L gastroc stretch stand at rail  1x10 CS 1x15 CS     Stand alt hip abd vs TB RTB @ knees 1x10 R/L RTB @ knees 1x10 R/L RTB @ knees 1x10 R/L     Stand alt hip exten RTB @ knees 1x10 R/L RTB R/L 1x10 R/L RTB @ knees 1x10 R/L     HEP Issued & reviewed       Ther Activity  10' 10'     bridges 2x5 2x10 3\" 2x10             Fwd step ups  6\" step 2x5 R/L w/ rail and CS 6\" step 1x10 R/L w/ rail CS     Gait Training                        Modalities                               "

## 2024-04-10 ENCOUNTER — OFFICE VISIT (OUTPATIENT)
Dept: URGENT CARE | Facility: CLINIC | Age: 79
End: 2024-04-10
Payer: MEDICARE

## 2024-04-10 ENCOUNTER — APPOINTMENT (OUTPATIENT)
Dept: PHYSICAL THERAPY | Facility: CLINIC | Age: 79
End: 2024-04-10
Payer: MEDICARE

## 2024-04-10 ENCOUNTER — TELEPHONE (OUTPATIENT)
Dept: PHYSICAL THERAPY | Facility: CLINIC | Age: 79
End: 2024-04-10

## 2024-04-10 VITALS
HEART RATE: 100 BPM | HEIGHT: 70 IN | TEMPERATURE: 97.3 F | OXYGEN SATURATION: 90 % | BODY MASS INDEX: 28.63 KG/M2 | RESPIRATION RATE: 18 BRPM | WEIGHT: 200 LBS | SYSTOLIC BLOOD PRESSURE: 107 MMHG | DIASTOLIC BLOOD PRESSURE: 63 MMHG

## 2024-04-10 DIAGNOSIS — R68.89 FLU-LIKE SYMPTOMS: Primary | ICD-10-CM

## 2024-04-10 PROCEDURE — 87636 SARSCOV2 & INF A&B AMP PRB: CPT | Performed by: FAMILY MEDICINE

## 2024-04-10 PROCEDURE — 99213 OFFICE O/P EST LOW 20 MIN: CPT | Performed by: FAMILY MEDICINE

## 2024-04-10 PROCEDURE — G2211 COMPLEX E/M VISIT ADD ON: HCPCS | Performed by: FAMILY MEDICINE

## 2024-04-10 RX ORDER — FLUTICASONE PROPIONATE 50 MCG
1 SPRAY, SUSPENSION (ML) NASAL 2 TIMES DAILY
Qty: 16 G | Refills: 0 | Status: SHIPPED | OUTPATIENT
Start: 2024-04-10 | End: 2024-04-13

## 2024-04-10 RX ORDER — OSELTAMIVIR PHOSPHATE 75 MG/1
75 CAPSULE ORAL EVERY 12 HOURS SCHEDULED
Qty: 10 CAPSULE | Refills: 0 | Status: SHIPPED | OUTPATIENT
Start: 2024-04-10 | End: 2024-04-15

## 2024-04-10 RX ORDER — BENZONATATE 200 MG/1
200 CAPSULE ORAL 3 TIMES DAILY PRN
Qty: 20 CAPSULE | Refills: 0 | Status: SHIPPED | OUTPATIENT
Start: 2024-04-10

## 2024-04-10 NOTE — PROGRESS NOTES
[facility]        NAME: Juve Livingston is a 78 y.o. male  : 1945    MRN: [id number]  DATE: April 10, 2024  TIME: 1:11 PM    Assessment and Plan   Flu-like symptoms [R68.89]  1. Flu-like symptoms  Covid/Flu-Office Collect    benzonatate (TESSALON) 200 MG capsule    oseltamivir (TAMIFLU) 75 mg capsule    fluticasone (FLONASE) 50 mcg/act nasal spray        COVID and flu PCR pending.  Will empirically treat for influenza with Tamiflu.  Symptomatic treatment with Tessalon Perles and Flonase.  Advised to hydrate with plenty fluids.    Patient Instructions     Follow up with PCP in 3-5 days.  Proceed to  ER if symptoms worsen.    If tests have been performed at [facility], our office will contact you with results if changes need to be made to the care plan discussed with you at the visit.  [facility]    Chief Complaint     Chief Complaint   Patient presents with    Cough     Sob, cough, wheezing fatigue- started 3 days ago. OTC Robitussin, Tylenol          History of Present Illness       78-year-old male presents today due to 3 days of URI symptoms including a dry cough associated with nasal congestion, fatigue, low-grade fevers up to 99.6 degrees, chills and dyspnea with exertion.  Denies any obvious sick contacts.  Has been treating with OTC Robitussin DM and Tylenol which has not been effective.    Cough  Associated symptoms include chills, a fever (99.6) and shortness of breath (with exertion). Pertinent negatives include no chest pain or headaches.       Review of Systems   Review of Systems   Constitutional:  Positive for chills, fatigue and fever (99.6).   HENT:  Positive for congestion.    Respiratory:  Positive for cough and shortness of breath (with exertion).    Cardiovascular:  Negative for chest pain.   Gastrointestinal:  Negative for abdominal pain.   Neurological:  Negative for headaches.     Current Medications       Current Outpatient  "Medications:     acyclovir (ZOVIRAX) 400 MG tablet, Take 400 mg by mouth 2 (two) times a day, Disp: , Rfl:     ARIPiprazole (ABILIFY) 2 mg tablet, Take 2 mg by mouth daily, Disp: , Rfl:     benzonatate (TESSALON) 200 MG capsule, Take 1 capsule (200 mg total) by mouth 3 (three) times a day as needed for cough, Disp: 20 capsule, Rfl: 0    docusate sodium (COLACE) 100 mg capsule, Take 100 mg by mouth once daily, Disp: , Rfl:     fluticasone (FLONASE) 50 mcg/act nasal spray, 1 spray into each nostril 2 (two) times a day for 3 days, Disp: 16 g, Rfl: 0    fluticasone-umeclidinium-vilanterol (Trelegy Ellipta) 100-62.5-25 mcg/actuation inhaler, Inhale 1 puff daily Rinse mouth after use., Disp: , Rfl:     mirtazapine (REMERON SOL-TAB) 45 mg dispersible tablet, Take 45 mg by mouth daily at bedtime, Disp: , Rfl:     oseltamivir (TAMIFLU) 75 mg capsule, Take 1 capsule (75 mg total) by mouth every 12 (twelve) hours for 5 days, Disp: 10 capsule, Rfl: 0    pantoprazole (PROTONIX) 40 mg tablet, Take 40 mg by mouth daily, Disp: , Rfl:     sertraline (ZOLOFT) 50 mg tablet, Take 50 mg by mouth daily, Disp: , Rfl:     sulfamethoxazole-trimethoprim (BACTRIM DS) 800-160 mg per tablet, Take 1 tablet by mouth 3 (three) times a week, Disp: , Rfl:     Current Allergies     Allergies as of 04/10/2024    (No Active Allergies)            The following portions of the patient's history were reviewed and updated as appropriate: allergies, current medications, past family history, past medical history, past social history, past surgical history and problem list.     History reviewed. No pertinent past medical history.    History reviewed. No pertinent surgical history.    History reviewed. No pertinent family history.      Medications have been verified.        Objective   /63   Pulse 100   Temp (!) 97.3 °F (36.3 °C)   Resp 18   Ht 5' 10\" (1.778 m)   Wt 90.7 kg (200 lb)   SpO2 90%   BMI 28.70 kg/m²   No LMP for male patient.     "   Physical Exam     Physical Exam  Vitals and nursing note reviewed.   Constitutional:       General: He is in acute distress.      Appearance: Normal appearance. He is normal weight. He is not ill-appearing, toxic-appearing or diaphoretic.   HENT:      Head: Normocephalic and atraumatic.      Nose: Congestion and rhinorrhea present.      Comments: Inflamed nasal mucosa     Mouth/Throat:      Mouth: Mucous membranes are moist.      Pharynx: No posterior oropharyngeal erythema.   Eyes:      General:         Right eye: No discharge.         Left eye: No discharge.      Conjunctiva/sclera: Conjunctivae normal.   Cardiovascular:      Rate and Rhythm: Regular rhythm. Tachycardia present.   Pulmonary:      Effort: Pulmonary effort is normal. No respiratory distress.      Breath sounds: Normal breath sounds. No wheezing, rhonchi or rales.   Skin:     General: Skin is warm.      Findings: No erythema.   Neurological:      General: No focal deficit present.      Mental Status: He is alert and oriented to person, place, and time.   Psychiatric:         Mood and Affect: Mood normal.         Behavior: Behavior normal.         Thought Content: Thought content normal.         Judgment: Judgment normal.

## 2024-04-11 LAB
FLUAV RNA RESP QL NAA+PROBE: NEGATIVE
FLUBV RNA RESP QL NAA+PROBE: NEGATIVE
SARS-COV-2 RNA RESP QL NAA+PROBE: NEGATIVE

## 2024-04-12 ENCOUNTER — OFFICE VISIT (OUTPATIENT)
Dept: URGENT CARE | Facility: CLINIC | Age: 79
End: 2024-04-12

## 2024-04-12 VITALS
SYSTOLIC BLOOD PRESSURE: 110 MMHG | OXYGEN SATURATION: 93 % | RESPIRATION RATE: 18 BRPM | WEIGHT: 200 LBS | BODY MASS INDEX: 28.63 KG/M2 | HEIGHT: 70 IN | DIASTOLIC BLOOD PRESSURE: 70 MMHG | HEART RATE: 105 BPM | TEMPERATURE: 96.3 F

## 2024-04-12 DIAGNOSIS — J44.1 COPD EXACERBATION (HCC): Primary | ICD-10-CM

## 2024-04-12 RX ORDER — ALBUTEROL SULFATE 90 UG/1
2 AEROSOL, METERED RESPIRATORY (INHALATION) EVERY 6 HOURS PRN
Qty: 6.7 G | Refills: 0 | Status: SHIPPED | OUTPATIENT
Start: 2024-04-12

## 2024-04-12 RX ORDER — MOLNUPIRAVIR 200 MG/1
200 CAPSULE ORAL EVERY 12 HOURS
COMMUNITY
Start: 2024-02-20

## 2024-04-12 RX ORDER — PREDNISONE 20 MG/1
20 TABLET ORAL 2 TIMES DAILY WITH MEALS
Qty: 10 TABLET | Refills: 0 | Status: SHIPPED | OUTPATIENT
Start: 2024-04-12 | End: 2024-04-17

## 2024-04-12 RX ORDER — ERGOCALCIFEROL 1.25 MG/1
50000 CAPSULE ORAL WEEKLY
COMMUNITY

## 2024-04-12 RX ORDER — IPRATROPIUM BROMIDE AND ALBUTEROL SULFATE 2.5; .5 MG/3ML; MG/3ML
3 SOLUTION RESPIRATORY (INHALATION) ONCE
Status: COMPLETED | OUTPATIENT
Start: 2024-04-12 | End: 2024-04-12

## 2024-04-12 RX ORDER — ALBUTEROL SULFATE 2.5 MG/3ML
2.5 SOLUTION RESPIRATORY (INHALATION) EVERY 4 HOURS PRN
COMMUNITY

## 2024-04-12 RX ORDER — SODIUM CHLORIDE FOR INHALATION 0.9 %
3 VIAL, NEBULIZER (ML) INHALATION ONCE
Status: COMPLETED | OUTPATIENT
Start: 2024-04-12 | End: 2024-04-12

## 2024-04-12 RX ORDER — ARIPIPRAZOLE 5 MG/1
5 TABLET ORAL EVERY MORNING
COMMUNITY
Start: 2024-02-07

## 2024-04-12 RX ORDER — LIDOCAINE 50 MG/G
PATCH TOPICAL AS NEEDED
COMMUNITY

## 2024-04-12 RX ORDER — ESCITALOPRAM OXALATE 20 MG/1
20 TABLET ORAL DAILY
COMMUNITY
End: 2024-04-12

## 2024-04-12 RX ADMIN — IPRATROPIUM BROMIDE AND ALBUTEROL SULFATE 3 ML: 2.5; .5 SOLUTION RESPIRATORY (INHALATION) at 16:45

## 2024-04-12 RX ADMIN — Medication 3 ML: at 16:45

## 2024-04-12 NOTE — PROGRESS NOTES
[facility]        NAME: Juve Livingston is a 78 y.o. male  : 1945    MRN: [id number]  DATE: 2024  TIME: 5:22 PM    Assessment and Plan   COPD exacerbation (HCC) [J44.1]  1. COPD exacerbation (HCC)  ipratropium-albuterol (DUO-NEB) 0.5-2.5 mg/3 mL inhalation solution 3 mL    sodium chloride 0.9 % inhalation solution 3 mL    albuterol (Proventil HFA) 90 mcg/act inhaler    predniSONE 20 mg tablet        Dyspnea and hypoxia resolved after 1 L O2 nasal cannula followed by a DuoNeb treatment.  5 days of steroid prescribed with as needed albuterol inhaler.    Patient Instructions     Follow up with PCP in 3-5 days.  Proceed to  ER if symptoms worsen.    If tests have been performed at [facility], our office will contact you with results if changes need to be made to the care plan discussed with you at the visit.  [facility]    Chief Complaint     Chief Complaint   Patient presents with    Cold Like Symptoms    Cough    Shortness of Breath     F/u from 4/10/24. Cough improved on Tessalon Perle but more SOB since yesterday.          History of Present Illness       78-year-old male with pertinent history of COPD presents today with worsening of dyspnea on exertion.  Was evaluated 2 days ago and treated for possible influenza.  Stopped the Tamiflu yesterday as his COVID and flu PCR was negative.  Presented with an O2 saturation of 85%.      Review of Systems   Review of Systems   Constitutional:  Negative for chills and fever.   Respiratory:  Positive for cough and shortness of breath.    Cardiovascular:  Negative for chest pain.   Gastrointestinal:  Negative for abdominal pain.     Current Medications       Current Outpatient Medications:     Acetaminophen (Tylenol) 325 MG CAPS, Take by mouth every 4 (four) hours as needed, Disp: , Rfl:     acyclovir (ZOVIRAX) 400 MG tablet, Take 400 mg by mouth 2 (two) times a day, Disp: , Rfl:     albuterol (Proventil  HFA) 90 mcg/act inhaler, Inhale 2 puffs every 6 (six) hours as needed for shortness of breath or wheezing, Disp: 6.7 g, Rfl: 0    ARIPiprazole (ABILIFY) 5 mg tablet, Take 5 mg by mouth every morning, Disp: , Rfl:     benzonatate (TESSALON) 200 MG capsule, Take 1 capsule (200 mg total) by mouth 3 (three) times a day as needed for cough, Disp: 20 capsule, Rfl: 0    docusate sodium (COLACE) 100 mg capsule, Take 100 mg by mouth once daily, Disp: , Rfl:     ergocalciferol (ERGOCALCIFEROL) 1.25 MG (29094 UT) capsule, Take 50,000 Units by mouth once a week, Disp: , Rfl:     fluticasone (FLONASE) 50 mcg/act nasal spray, 1 spray into each nostril 2 (two) times a day for 3 days, Disp: 16 g, Rfl: 0    fluticasone-umeclidinium-vilanterol (Trelegy Ellipta) 100-62.5-25 mcg/actuation inhaler, Inhale 1 puff daily Rinse mouth after use., Disp: , Rfl:     lidocaine (LIDODERM) 5 %, Apply topically if needed, Disp: , Rfl:     mirtazapine (REMERON SOL-TAB) 45 mg dispersible tablet, Take 45 mg by mouth daily at bedtime, Disp: , Rfl:     pantoprazole (PROTONIX) 40 mg tablet, Take 40 mg by mouth daily, Disp: , Rfl:     predniSONE 20 mg tablet, Take 1 tablet (20 mg total) by mouth 2 (two) times a day with meals for 5 days, Disp: 10 tablet, Rfl: 0    sertraline (ZOLOFT) 50 mg tablet, Take 100 mg by mouth daily, Disp: , Rfl:     sulfamethoxazole-trimethoprim (BACTRIM DS) 800-160 mg per tablet, Take 1 tablet by mouth 3 (three) times a week, Disp: , Rfl:     albuterol (2.5 mg/3 mL) 0.083 % nebulizer solution, Take 2.5 mg by nebulization every 4 (four) hours as needed (Patient not taking: Reported on 4/12/2024), Disp: , Rfl:     Lagevrio 200 MG capsule, 200 mg every 12 (twelve) hours (Patient not taking: Reported on 4/12/2024), Disp: , Rfl:     oseltamivir (TAMIFLU) 75 mg capsule, Take 1 capsule (75 mg total) by mouth every 12 (twelve) hours for 5 days (Patient not taking: Reported on 4/12/2024), Disp: 10 capsule, Rfl: 0  No current  "facility-administered medications for this visit.    Current Allergies     Allergies as of 04/12/2024 - Reviewed 04/12/2024   Allergen Reaction Noted    Codeine Vomiting and GI Intolerance 11/21/2002    Oxycodone Vomiting and GI Intolerance 11/21/2002            The following portions of the patient's history were reviewed and updated as appropriate: allergies, current medications, past family history, past medical history, past social history, past surgical history and problem list.     History reviewed. No pertinent past medical history.    History reviewed. No pertinent surgical history.    History reviewed. No pertinent family history.      Medications have been verified.        Objective   /70   Pulse 105   Temp (!) 96.3 °F (35.7 °C)   Resp 18   Ht 5' 10\" (1.778 m)   Wt 90.7 kg (200 lb)   SpO2 93% Comment: post DuoNeb treatment  BMI 28.70 kg/m²   No LMP for male patient.       Physical Exam     Physical Exam  Vitals and nursing note reviewed.   Constitutional:       General: He is in acute distress.      Appearance: He is well-developed and normal weight. He is not ill-appearing, toxic-appearing or diaphoretic.   HENT:      Head: Normocephalic and atraumatic.   Cardiovascular:      Rate and Rhythm: Regular rhythm. Tachycardia present.   Pulmonary:      Effort: No accessory muscle usage.      Breath sounds: Examination of the right-upper field reveals wheezing. Examination of the left-upper field reveals wheezing. Examination of the right-middle field reveals wheezing. Examination of the left-middle field reveals wheezing. Examination of the right-lower field reveals wheezing. Examination of the left-lower field reveals wheezing. Wheezing (Expiratory) present. No decreased breath sounds, rhonchi or rales.   Skin:     Coloration: Skin is cyanotic.      Findings: No rash.   Neurological:      General: No focal deficit present.      Mental Status: He is alert.      Motor: No weakness.   Psychiatric:    "      Mood and Affect: Mood normal. Mood is not anxious.         Behavior: Behavior normal. Behavior is not agitated.

## 2024-04-15 ENCOUNTER — TELEPHONE (OUTPATIENT)
Dept: PHYSICAL THERAPY | Facility: CLINIC | Age: 79
End: 2024-04-15

## 2024-04-16 ENCOUNTER — APPOINTMENT (OUTPATIENT)
Dept: PHYSICAL THERAPY | Facility: CLINIC | Age: 79
End: 2024-04-16
Payer: MEDICARE

## 2024-04-18 ENCOUNTER — APPOINTMENT (OUTPATIENT)
Dept: PHYSICAL THERAPY | Facility: CLINIC | Age: 79
End: 2024-04-18
Payer: MEDICARE

## 2024-04-22 ENCOUNTER — TELEPHONE (OUTPATIENT)
Dept: PHYSICAL THERAPY | Facility: CLINIC | Age: 79
End: 2024-04-22

## 2024-04-22 ENCOUNTER — APPOINTMENT (OUTPATIENT)
Dept: PHYSICAL THERAPY | Facility: CLINIC | Age: 79
End: 2024-04-22
Payer: MEDICARE

## 2024-04-22 NOTE — TELEPHONE ENCOUNTER
patient Called [facility] stating he is still coughing - going to doctor 4/23 -will call Thursday to confirm Friday appt.     Amelia Pandey PT, DPT   License #  [id number]

## 2024-04-26 ENCOUNTER — APPOINTMENT (OUTPATIENT)
Dept: PHYSICAL THERAPY | Facility: CLINIC | Age: 79
End: 2024-04-26
Payer: MEDICARE

## 2024-04-29 ENCOUNTER — EVALUATION (OUTPATIENT)
Dept: PHYSICAL THERAPY | Facility: CLINIC | Age: 79
End: 2024-04-29
Payer: MEDICARE

## 2024-04-29 DIAGNOSIS — S22.008D OTHER FRACTURE OF UNSPECIFIED THORACIC VERTEBRA, SUBSEQUENT ENCOUNTER FOR FRACTURE WITH ROUTINE HEALING: ICD-10-CM

## 2024-04-29 DIAGNOSIS — C90.01 MULTIPLE MYELOMA IN REMISSION (HCC): Primary | ICD-10-CM

## 2024-04-29 PROCEDURE — 97112 NEUROMUSCULAR REEDUCATION: CPT | Performed by: PHYSICAL THERAPIST

## 2024-04-29 PROCEDURE — 97530 THERAPEUTIC ACTIVITIES: CPT | Performed by: PHYSICAL THERAPIST

## 2024-04-29 PROCEDURE — 97110 THERAPEUTIC EXERCISES: CPT | Performed by: PHYSICAL THERAPIST

## 2024-04-29 NOTE — PROGRESS NOTES
PT Re-Evaluation     Today's date: 2024  Patient name: Juve Livingston  : 1945  MRN: [id number]  Referring provider: Parmer, Lauren  Dx:   Encounter Diagnosis     ICD-10-CM    1. Multiple myeloma in remission (HCC)  C90.01       2. Other fracture of unspecified thoracic vertebra, subsequent encounter for fracture with routine healing  S22.008D                      Assessment  Assessment details: 2024: Pt returns today after 3 week break in care due to patient kimberli pneumonia. RE performed today due to prolonged illness to determine if any change in status. Pt demonstrates some decrease in MMT, balance testing fairly consistent w/ IE, flexibility remains unchanged. Pt presentation fairly consistent w/ SOC 4/3/2024. Plan of care will remain unchanged. Pt endurance does seem a bit regressed, but pt remains a good candidate for skilled PT. He was encouraged to resume his HEP as he feels able. Pt continues w/ Rollator walker for outings and intermittent use indoors.    4/3/2024: Juve Livingston is a 78 y.o. male who presents 2 years post thoracic fusion with lumbar with pain, decreased strength, decreased ROM, decreased joint mobility, ambulatory dysfunction, postural dysfunction, balance dysfunction, and decreased endurance. Due to these impairments, patient has difficulty performing ADL's, recreational activities, engaging in social activities, ambulation, stair negotiation, lifting/carrying, transfers, reaching. Patient's clinical presentation is consistent with their referring diagnosis of thoracic compression fx (s/p fusion) and general deconditioning.   Patient has been educated in home exercise program and plan of care. Patient would benefit from skilled physical therapy services to address their aforementioned functional limitations and progress towards prior level of function and independence with home exercise program and self symptom management/resolution.     Impairments: abnormal or  restricted ROM, activity intolerance, impaired balance, impaired physical strength, lacks appropriate home exercise program, pain with function, poor posture  and poor body mechanics  Other impairment: poor tolerance to prolonged static positions  Understanding of Dx/Px/POC: good   Prognosis: good    Goals  Short Term Goals:  Target Date 5/1/2024  1. Initiate and advance HEP toward self symptom reduction/resolution.- not met - ill for 3 weeks  2. Improve hip flexor flexibility to allow pt to stand fully upright and to report improved static standing tolerance to >/=10 min w/ 0-2/10 LBP. - not met  3. Improve hamstring flexibility to 65 deg or better B/L to allow lumbar flexion to min robertson or better.- improved/not met  4. Improve LE strength such that pt can complete 5xSTS w/o need for UE assist. - not met  5. Pt to report at least 50% reduction in pain upon raising from prolonged sitting. - not met    Long Term Goals:  Target Date 6/26/2024 - all LTG's ongoing  1. Indep with HEP and self symptom prevention.  2. Improve ABC scale (w/ RW) to >/=75% confidence.  3. Improve LE strength to >/= 4/5 to allow ease w/ bridges, bed mobility and achieve 5xSTS w/o UE assist in 22 sec or less.  4. Improve balance such that pt can maintain SLS w/ EO x 5 sec or more R/L and achieve FGA score of 24/30 or better.  5. Pt to be able to ambulate community distances w/ SPC or no AD.       Plan  Patient would benefit from: skilled PT and PT eval  Planned modality interventions: thermotherapy: hydrocollator packs and unattended electrical stimulation  Planned therapy interventions: joint mobilization, patient education, postural training, abdominal trunk stabilization, functional ROM exercises, home exercise program, neuromuscular re-education, strengthening, stretching, therapeutic activities and therapeutic exercise  Other planned therapy interventions: mechanical assessment  Frequency: 2x week  Duration in weeks: 8  Treatment plan  "discussed with: patient      Subjective Evaluation    History of Present Illness  Mechanism of injury: Subjective   2024: Pt returns to PT after 3 week break in care due to pneumonia (5th time he's had it). He reports he's been working on walking in his home, but has not kept up w/ his HEP during the past 3 weeks. He denies any increase in pain or new pain. He reports mild decrease in his endurance, but thinks he's \"doing pretty good considering I had pneumonia\". He reports no change in his back pain.    4/3/2024: Pt reports to PT for general deconditioning, imbalance, LE weakness and also reports some back pain. He had a compression fx at T3 and subsequently had thoracic fusion surgery T2-T6 in 2021. He was sent to rehab, but returned to the hospital due to pneumonia. He was soon after dx w/ melanoma. He underwent treatment for this and is currently in remission. He never completed rehab after his back surgery, citing multiple bouts of pneumonia, Covid and cancer treatments.   He c/o low back pain now, occurs w/ rising from prolonged sitting or standing; better as he walks. No UE or LE symptoms.   He c/o leg crams at night.   Pt has been using rollator walker for ambulation outside of his home since his back surgery. In his home, he ambulates w/o AD and denies holding onto furniture.  He arrives to PT w/ Rx from his surgeon Dr Ovalle for Thoracic fx rehab and from Oncologist Dr Wright for general conditioning. He states he will follow up w/ oncologist, but may not follow up w/ ortho surgeon.   Patient Goals  Patient goals for therapy: increased strength, decreased pain, independence with ADLs/IADLs and return to sport/leisure activities  Patient goal: less back pain; stronger legs  Pain  At best pain ratin  At worst pain ratin  Quality: dull ache and tight  Progression: no change    Treatments  Current treatment: physical therapy        Objective      MYOTOMAL " TESTIN2024 2024 4/3/2024 4/3/2024     Right  Left  Right  Left  L2-3 Hip flexion:  4+/5  4+/5  5/5  4+/5  L3-4 Knee extension:  4+/5  4+/5  5/5  4+/5  L5 Great toe exten:    5  5/5  L4 Heel walk/DF:    5  5  S1 toe walk/PF/ever:    4  S2 knee flex:     Hip ER   4+/5  4+/5  4+/5  4+/5  Hip IR     4+/5  4+/5  Hip abd  4-/5  4-/5  /5  Hip exten  -/5  -/5  3-/5  3+/5    LUMBAR AROM: 2024 4/3/2024  Flexion   Mod  Mod robertson (FT to mid shin)  Extension  Onel  onel 90% robertson  R sideglide  Mod  Mod 60% robertson  L sideglide  Onel  Onel 80% robertson      FUNCTION:  2024: Pt is limited with ambulation w/o AD - uses RW for outings for stability since thor fusion 2 years ago   Mod difficulty w/ bridges/bed mobility   LBP w/ getting up from prolonged sitting and w/ static standing > 10 min   Quad lag B/L (knee ext qset/SLR): R +2/-4; L 0/-4   Demonstrates flexed posture in standing (hip flexor tightness)   Limited forward bending due to hamstring tightness  4/3/2024: Pt is limited with ambulation w/o AD - uses RW for outings for stability since thor fusion 2 years ago   Mod difficulty w/ bridges/bed mobility   LBP w/ getting up from prolonged sitting and w/ static standing > 5 min   Quad lag B/L (knee ext qset/SLR): R +2/-4; L 0/-4   Demonstrates flexed posture in standing (hip flexor tightness)   Limited forward bending due to hamstring tightness  FLEXIBILITY:  2024: hamsrting flexibility mod/onel robertson B/L 60 deg   Quads min robertson B/L prone knee flex R110/ L 120; hip flexors mod robertson B/L   4/3/2024: Hamstring flexibility onel robertson B/L 55 deg         Quad/hip flexor flexibitlity mod robertson R/min robertson L -  knee flex sup/prone R- 105/113; L 120/124        Pirif flexibility mod/onel robertson B/L       Outcome Measures Initial Eval  4/3/2024 RE  2024       5xSTS 35 sec minor use of hands 29.5 SEC min use hands; 7/10 fatigue       TUG 14.67 sec no AD 16 sec no AD        10 meter NT sec =  NT m/s        CAMPOS NT/56        FGA 13/30 4/5/2024 13/30       mCTSIB  - FTEO (firm)  - FTEC (firm)  - FTEO (foam)  - FTEC (foam)   60+ sec  8 sec  60+ sec  3 sec   60+ sec  60+ sec  60+ sec  5 sec       6MWT NT meters in NT min        SLS EO 1 sec R/L 1 sec R/L       ABC Scale 58.13% w/ Rollator 56.88% w/ rollator            Cut off scores:  All data taken from [facility] or Rehab Measures    Campos: <46/56=falls risk  MDC: 6 pts  Age Norms:  60-69: M - 55   F - 55  70-79: M - 54   F - 53  80-89: M - 53   F - 50 5xSTS: Natan et al 2010  MDC: 2.3 sec  Age Norms:  60-69: 11.1 sec  70-79: 12.6 sec  80-89: 14.8 sec   TUG  MDC: 4.14 sec  Cut off score:  >13.5 sec community dwelling adults  >32.2 sec frail elderly  <20 sec: I for basic transfers  >30: dependent for transfers 10 Meter Walk Test Norms: Julieta and Raffy et al 2011  20-29: M - 1.35 m   F - 1.34 m  30-39: M - 1.43 m   F - 1.34 m  40-49: M - 1.43 m   F - 1.39 m  50-59: M - 1.43 m   F - 1.31 m  60-69: M - 1.34 m   F - 1.24 m  70-79: M - 1.26 m   F - 1.13 m  80-89: M - 0.97 m   F - 0.94 m   FGA  MCID: 4 pts  Geriatrics/community < 22/30 fall risk  Geriatrics/community < 20/30 unexplained falls DGI  MDC: vestibular - 4 pts  MDC: geriatric/community - 3 pts  Falls risk <19/24   6 Minute Walk Test  Age Norms  60-69: M - 1876 ft (571.80 m)  F - 1765 ft (537.98 m)  70-79: M - 1729 ft (527.00 m)  F - 1545 ft (470.92 m)  80-89: M - 1368 ft (416.97 m)  F - 1286 ft (391.97 m) mCTSIB  Norm: 20-60 yrs  Eyes open firm: norm sway 0.21-0.48  Eyes closed firm: norm sway 0.48-0.99  Eyes open foam: norm sway 0.38-0.71  Eyes closed foam: norm sway 0.70-2.22         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  Access Code: [email or web address]  URL: [email or web address]  Date: 04/03/2024  Prepared by: Mary Alice Espinosa    Exercises  - Supine Bridge  - 1 x daily - 2 sets - 5 reps  - Modified Kenroy Stretch  - 1 x daily - 2 reps - 1 min hold  -  "Hooklying Hamstring Stretch with Strap  - 1 x daily - 30 reps - 2 hold  - Standing Hip Abduction with Resistance at Ankles and Counter Support  - 1 x daily - 10 reps  - Standing Hip Extension with Resistance at Ankles and Unilateral Counter Support  - 1 x daily - 7 x weekly - 10 reps  SOC: 4/3/2024  FOTO: 4/3/2024  POC EXP: 6/26/2024  DAILY TREATMENT LOG  date 4/3/2024 4/5/2024 4/8/2024 4/29/2024  RE    Visit #/auth 1 2 3 4    manual   5'     Hip flexor stretch in SL   20\"x2 R/L in btwn sets of SLR abd             Neuro Re-Ed  15' 10' 15'    FGA  Next visit 13/30 13/30    ABC scale    TT 56.88%    Alt taps to step  6\" step 1x5 R/L CGA 6\" step 1x10 R/L CGA     Tandem walk        Retro walk        FT W/ EC        FT on foam w/ horiz HT  1x10 R/L CGA 1x10 R/L CGA     Low mat STS w/ feet on 2\" foam  1x10 24\" table CGA 2x10 CS 23\" table height; CGA     Ther Ex 15' 20' 20' 20'    ROM/MMT    TT    Sup ham stretch w/ SOS 30\"x2 R/L 30\"x2 R/L 30\"x2 R/L 20\"x3 R/L    Hip flexor stretch off edge 1'x1 R/L 1'x1 R/L 1'x1 R/L 1'x1 R/L            Hip abd in SL R/L  1x10 R/L 2x10 R/L     Rockerboard PD w/ rail for B/L gastroc stretch stand at rail  1x10 CS 1x15 CS 1x15 CS    Stand alt hip abd vs TB RTB @ knees 1x10 R/L RTB @ knees 1x10 R/L RTB @ knees 1x10 R/L RTB @ knees 1x10 R/L    Stand alt hip exten RTB @ knees 1x10 R/L RTB R/L 1x10 R/L RTB @ knees 1x10 R/L RTB @ knees 1x10 R/L    HEP Issued & reviewed       Ther Activity  10' 10' 10'    bridges 2x5 2x10 3\" 2x10 3\" 2x10    5xSTS; TUG    TT    Fwd step ups  6\" step 2x5 R/L w/ rail and CS 6\" step 1x10 R/L w/ rail CS     Gait Training                        Modalities                                 "

## 2024-05-02 ENCOUNTER — OFFICE VISIT (OUTPATIENT)
Dept: PHYSICAL THERAPY | Facility: CLINIC | Age: 79
End: 2024-05-02
Payer: MEDICARE

## 2024-05-02 DIAGNOSIS — C90.01 MULTIPLE MYELOMA IN REMISSION (HCC): Primary | ICD-10-CM

## 2024-05-02 DIAGNOSIS — S22.008D OTHER FRACTURE OF UNSPECIFIED THORACIC VERTEBRA, SUBSEQUENT ENCOUNTER FOR FRACTURE WITH ROUTINE HEALING: ICD-10-CM

## 2024-05-02 PROCEDURE — 97112 NEUROMUSCULAR REEDUCATION: CPT

## 2024-05-02 PROCEDURE — 97110 THERAPEUTIC EXERCISES: CPT

## 2024-05-02 NOTE — PROGRESS NOTES
"Daily Note     Today's date: 2024  Patient name: Juve Livingston  : 1945  MRN: [id number]  Referring provider: Parmer, Lauren  Dx:   Encounter Diagnosis     ICD-10-CM    1. Multiple myeloma in remission (HCC)  C90.01       2. Other fracture of unspecified thoracic vertebra, subsequent encounter for fracture with routine healing  S22.008D                      Subjective: Patient reports his lower back is a bit better today       Objective: See treatment diary below      Assessment: Tolerated treatment well with improving control with STS from 23\" mat. Patient denied any increase in LBP at end of session. Patient demonstrated fatigue post treatment and would benefit from continued PT for Global hip strengthening and to improve hip flexor flexibility.      Plan: Continue per plan of care.      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  Access Code: [email or web address]  URL: [email or web address]  Date: 2024  Prepared by: Mary Alice Espinosa    Exercises  - Supine Bridge  - 1 x daily - 2 sets - 5 reps  - Modified Kenroy Stretch  - 1 x daily - 2 reps - 1 min hold  - Hooklying Hamstring Stretch with Strap  - 1 x daily - 30 reps - 2 hold  - Standing Hip Abduction with Resistance at Ankles and Counter Support  - 1 x daily - 10 reps  - Standing Hip Extension with Resistance at Ankles and Unilateral Counter Support  - 1 x daily - 7 x weekly - 10 reps  SOC: 4/3/2024  FOTO: 4/3/2024  POC EXP: 2024  DAILY TREATMENT LOG  date 4/3/2024 2024 2024 2024  RE 2024   Visit #/auth 1 2 3 4 5   manual   5'  5'   Hip flexor stretch in SL   20\"x2 R/L in btwn sets of SLR abd  20\"x2 R/L in btwn sets of SLR abd           Neuro Re-Ed  15' 10' 15' 10'   FGA  Next visit     ABC scale    TT 56.88%    Alt taps to step  6\" step 1x5 R/L CGA 6\" step 1x10 R/L CGA  6\" step 1x10 R/L CGA   Tandem walk        Retro walk        FT W/ EC        FT on foam w/ horiz HT  1x10 R/L CGA 1x10 R/L CGA     Low " "mat STS w/ feet on 2\" foam  1x10 24\" table CGA 2x10 CS 23\" table height; CGA  2x10 CS 23\" table height; CGA   Ther Ex 15' 20' 20' 20' 23'   ROM/MMT    TT    Sup ham stretch w/ SOS 30\"x2 R/L 30\"x2 R/L 30\"x2 R/L 20\"x3 R/L 20\"x3 R/L   Hip flexor stretch off edge 1'x1 R/L 1'x1 R/L 1'x1 R/L 1'x1 R/L 1'x1 R/L           Hip abd in SL R/L  1x10 R/L 2x10 R/L  1x10 R/L    Rockerboard PD w/ rail for B/L gastroc stretch stand at rail  1x10 CS 1x15 CS 1x15 CS 1x15 CS   Stand alt hip abd vs TB RTB @ knees 1x10 R/L RTB @ knees 1x10 R/L RTB @ knees 1x10 R/L RTB @ knees 1x10 R/L RTB @ knees 1x10 R/L   Stand alt hip exten RTB @ knees 1x10 R/L RTB R/L 1x10 R/L RTB @ knees 1x10 R/L RTB @ knees 1x10 R/L RTB @ knees 1x10 R/L   HEP Issued & reviewed       Ther Activity  10' 10' 10'    bridges 2x5 2x10 3\" 2x10 3\" 2x10 3\" 2x10   5xSTS; TUG    TT    Fwd step ups  6\" step 2x5 R/L w/ rail and CS 6\" step 1x10 R/L w/ rail CS     Gait Training                        Modalities                                 "

## 2024-05-06 ENCOUNTER — OFFICE VISIT (OUTPATIENT)
Dept: PHYSICAL THERAPY | Facility: CLINIC | Age: 79
End: 2024-05-06
Payer: MEDICARE

## 2024-05-06 DIAGNOSIS — S22.008D OTHER FRACTURE OF UNSPECIFIED THORACIC VERTEBRA, SUBSEQUENT ENCOUNTER FOR FRACTURE WITH ROUTINE HEALING: ICD-10-CM

## 2024-05-06 DIAGNOSIS — C90.01 MULTIPLE MYELOMA IN REMISSION (HCC): Primary | ICD-10-CM

## 2024-05-06 PROCEDURE — 97110 THERAPEUTIC EXERCISES: CPT

## 2024-05-06 PROCEDURE — 97112 NEUROMUSCULAR REEDUCATION: CPT

## 2024-05-06 NOTE — PROGRESS NOTES
"Daily Note     Today's date: 2024  Patient name: Juve Livingston  : 1945  MRN: [id number]  Referring provider: Parmer, Lauren  Dx:   Encounter Diagnosis     ICD-10-CM    1. Multiple myeloma in remission (HCC)  C90.01       2. Other fracture of unspecified thoracic vertebra, subsequent encounter for fracture with routine healing  S22.008D                        Subjective: Patient reports \"I am doing ok today\"       Objective: See treatment diary below      Assessment: Tolerated treatment well. Patient challenged with tandem walking today, however was able to perform with CS near counter. Patient denied any increase in LBP at end of session. Patient HEP updated to included HR/TR with counter support and tandem walking. Patient demonstrated fatigue post treatment and would benefit from continued PT for Global hip strengthening and to improve hip flexibility.      Plan: Continue per plan of care.      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  Access Code: [email or web address]  URL: [email or web address]  Date: 2024  Prepared by: Amelia Pandey    Exercises  - Supine Bridge  - 1 x daily - 2 sets - 5 reps  - Modified Kenroy Stretch  - 1 x daily - 2 reps - 1 min hold  - Hooklying Hamstring Stretch with Strap  - 1 x daily - 30 reps - 2 hold  - Standing Hip Abduction with Resistance at Ankles and Counter Support  - 1 x daily - 10 reps  - Standing Hip Extension with Resistance at Ankles and Unilateral Counter Support  - 1 x daily - 7 x weekly - 10 reps  - Heel Toe Raises with Counter Support  - 1 x daily - 7 x weekly - 1 sets - 10 reps  - Tandem Walking with Counter Support  - 1 x daily - 7 x weekly - 2 sets - 10 reps    SOC: 4/3/2024  FOTO: 4/3/2024  POC EXP: 2024  DAILY TREATMENT LOG  date 2024  RE 2024   Visit #/auth 6   4 5   manual     5'   Hip flexor stretch in SL     20\"x2 R/L in btwn sets of SLR abd           Neuro Re-Ed 10'   15' 10'   FGA         ABC " "scale    TT 56.88%    Alt taps to step 6\" step 1x10 R/L CGA    6\" step 1x10 R/L CGA   Tandem walk 2 laps near island counter CS no UE        Retro walk        FT W/ EC        FT on foam w/ horiz HT        Low mat STS w/ feet on 2\" foam 2x10 CS 23\" table height; CS    2x10 CS 23\" table height; CGA   Ther Ex 23'   20' 23'   ROM/MMT    TT    Sup ham stretch w/ SOS 20\"x3 R/L   20\"x3 R/L 20\"x3 R/L   Hip flexor stretch off edge 1'x1 R/L   1'x1 R/L 1'x1 R/L   Seated HS curl  RTB 1x10 R/L        LAQ  1.5# 1x10 R/L alt        Hip abd in SL R/L     1x10 R/L    Rockerboard PD w/ rail for B/L gastroc stretch stand at rail 1x15 CS   1x15 CS 1x15 CS   Stand alt hip abd vs TB RTB @ knees 1x10 R/L   RTB @ knees 1x10 R/L RTB @ knees 1x10 R/L   Stand alt hip exten RTB @ knees 1x10 R/L   RTB @ knees 1x10 R/L RTB @ knees 1x10 R/L   HEP        Ther Activity 5'   10'    bridges 3\" 2x10    3\" 2x10 3\" 2x10   5xSTS; TUG    TT    Fwd step ups 6\" step 1x10 R/L alt        Gait Training                        Modalities                                 "

## 2024-05-09 ENCOUNTER — OFFICE VISIT (OUTPATIENT)
Dept: PHYSICAL THERAPY | Facility: CLINIC | Age: 79
End: 2024-05-09
Payer: MEDICARE

## 2024-05-09 DIAGNOSIS — C90.01 MULTIPLE MYELOMA IN REMISSION (HCC): Primary | ICD-10-CM

## 2024-05-09 DIAGNOSIS — S22.008D OTHER FRACTURE OF UNSPECIFIED THORACIC VERTEBRA, SUBSEQUENT ENCOUNTER FOR FRACTURE WITH ROUTINE HEALING: ICD-10-CM

## 2024-05-09 PROCEDURE — 97530 THERAPEUTIC ACTIVITIES: CPT

## 2024-05-09 PROCEDURE — 97110 THERAPEUTIC EXERCISES: CPT

## 2024-05-09 PROCEDURE — 97112 NEUROMUSCULAR REEDUCATION: CPT

## 2024-05-09 NOTE — PROGRESS NOTES
"Daily Note     Today's date: 2024  Patient name: Juve Livingston  : 1945  MRN: [id number]  Referring provider: Parmer, Lauren  Dx:   Encounter Diagnosis     ICD-10-CM    1. Multiple myeloma in remission (HCC)  C90.01       2. Other fracture of unspecified thoracic vertebra, subsequent encounter for fracture with routine healing  S22.008D                        Subjective: Patient reports he was walking a lot the last few days since he has family over but wasn't able to do his HEP as much \"I gotta get back to it\"       Objective: See treatment diary below      Assessment: Tolerated treatment well. Patient challenged with FTEC on foam however decrease in sway level from mod to min with last set. Patient denied any increase in LBP at end of session.  Patient demonstrated fatigue post treatment and would benefit from continued PT for Global hip strengthening and to improve hip flexibility.      Plan: Continue per plan of care.      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  Access Code: [email or web address]  URL: [email or web address]  Date: 2024  Prepared by: Amelia Pandey    Exercises  - Supine Bridge  - 1 x daily - 2 sets - 5 reps  - Modified Kenroy Stretch  - 1 x daily - 2 reps - 1 min hold  - Hooklying Hamstring Stretch with Strap  - 1 x daily - 30 reps - 2 hold  - Standing Hip Abduction with Resistance at Ankles and Counter Support  - 1 x daily - 10 reps  - Standing Hip Extension with Resistance at Ankles and Unilateral Counter Support  - 1 x daily - 7 x weekly - 10 reps  - Heel Toe Raises with Counter Support  - 1 x daily - 7 x weekly - 1 sets - 10 reps  - Tandem Walking with Counter Support  - 1 x daily - 7 x weekly - 2 sets - 10 reps    SOC: 4/3/2024  FOTO: 4/3/2024  POC EXP: 2024  DAILY TREATMENT LOG  date 2024  RE 2024   Visit #/auth 6 7  4 5   manual     5'   Hip flexor stretch in SL     20\"x2 R/L in btwn sets of SLR abd           Neuro Re-Ed " "10' 10'  15' 10'   FGA     13/30    ABC scale    TT 56.88%    Alt taps to step 6\" step 1x10 R/L CGA 6\" step 1x10 R/L CGA   6\" step 1x10 R/L CGA   Tandem walk 2 laps near island counter CS no UE  2 laps near island counter CS no UE       Retro walk        FT W/ EC  On firm 20\" x3 CS/CGA       FT on foam w/ horiz HT        Low mat STS w/ feet on 2\" foam 2x10 CS 23\" table height; CS 2x10 CS 23\" table height;    2x10 CS 23\" table height; CGA   Ther Ex 23' 20'  20' 23'   ROM/MMT    TT    Sup ham stretch w/ SOS 20\"x3 R/L 20\"x3 R/L  20\"x3 R/L 20\"x3 R/L   Hip flexor stretch off edge 1'x1 R/L 1'x1 R/L  1'x1 R/L 1'x1 R/L   Seated HS curl  RTB 1x10 R/L  RTB 1x10 R/L       LAQ  1.5# 1x10 R/L alt  1.5# 1x10 R/L alt       Hip abd in SL R/L     1x10 R/L    Rockerboard PD w/ rail for B/L gastroc stretch stand at rail 1x15 CS 1x15 CS  1x15 CS 1x15 CS   Stand alt hip abd vs TB RTB @ knees 1x10 R/L RTB @ shin 1x10 R/L  RTB @ knees 1x10 R/L RTB @ knees 1x10 R/L   Stand alt hip exten RTB @ knees 1x10 R/L RTB @ shin 1x10 R/L  RTB @ knees 1x10 R/L RTB @ knees 1x10 R/L   HEP        Ther Activity 5' 8'  10'    bridges 3\" 2x10  3\" 2x10   3\" 2x10 3\" 2x10   5xSTS; TUG    TT    Fwd step ups 6\" step 1x10 R/L alt  6\" step 1x10 R/L alt       Gait Training                        Modalities                                 "

## 2024-05-14 ENCOUNTER — OFFICE VISIT (OUTPATIENT)
Dept: PHYSICAL THERAPY | Facility: CLINIC | Age: 79
End: 2024-05-14
Payer: MEDICARE

## 2024-05-14 DIAGNOSIS — C90.01 MULTIPLE MYELOMA IN REMISSION (HCC): ICD-10-CM

## 2024-05-14 DIAGNOSIS — S22.008D OTHER FRACTURE OF UNSPECIFIED THORACIC VERTEBRA, SUBSEQUENT ENCOUNTER FOR FRACTURE WITH ROUTINE HEALING: Primary | ICD-10-CM

## 2024-05-14 PROCEDURE — 97110 THERAPEUTIC EXERCISES: CPT | Performed by: PHYSICAL THERAPIST

## 2024-05-14 PROCEDURE — 97112 NEUROMUSCULAR REEDUCATION: CPT | Performed by: PHYSICAL THERAPIST

## 2024-05-14 PROCEDURE — 97530 THERAPEUTIC ACTIVITIES: CPT | Performed by: PHYSICAL THERAPIST

## 2024-05-14 NOTE — PROGRESS NOTES
"Daily Note     Today's date: 2024  Patient name: Juve Livingston  : 1945  MRN: [id number]  Referring provider: Parmer, Lauren  Dx:   Encounter Diagnosis     ICD-10-CM    1. Other fracture of unspecified thoracic vertebra, subsequent encounter for fracture with routine healing  S22.008D       2. Multiple myeloma in remission (HCC)  C90.01                      Subjective: Pt reports he walks in his home w/o the RW. He is keeping up w/ his HEP most days.      Objective: See treatment diary below; was able to dec height of low mat for STS today w/o difficulty.      Assessment: Tolerated treatment well and is requiring less rest/switching from WB to NWB exercises (improving endurance) . Patient would benefit from continued PT      Plan: Progress treatment as tolerated.       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  Access Code: [email or web address]  URL: [email or web address]  Date: 2024  Prepared by: Amelia Pandey    Exercises  - Supine Bridge  - 1 x daily - 2 sets - 5 reps  - Modified Kenroy Stretch  - 1 x daily - 2 reps - 1 min hold  - Hooklying Hamstring Stretch with Strap  - 1 x daily - 30 reps - 2 hold  - Standing Hip Abduction with Resistance at Ankles and Counter Support  - 1 x daily - 10 reps  - Standing Hip Extension with Resistance at Ankles and Unilateral Counter Support  - 1 x daily - 7 x weekly - 10 reps  - Heel Toe Raises with Counter Support  - 1 x daily - 7 x weekly - 1 sets - 10 reps  - Tandem Walking with Counter Support  - 1 x daily - 7 x weekly - 2 sets - 10 reps    SOC: 4/3/2024  Re: 2024  FOTO: 4/3/2024  POC EXP: 2024  DAILY TREATMENT LOG  date 2024   Visit #/auth 6 7 8  5   manual     5'   Hip flexor stretch in SL     20\"x2 R/L in btwn sets of SLR abd           Neuro Re-Ed 10' 10' 20'  10'   FGA         ABC scale        Alt taps to step 6\" step 1x10 R/L CGA 6\" step 1x10 R/L CGA 6\" step 1x10 R/L CS  6\" step 1x10 R/L CGA " "  Tandem walk 2 laps near island counter CS no UE  2 laps near island counter CS no UE       Retro walk   25'x1 CS     FT W/ EC  On firm 20\" x3 CS/CGA  Feet apart on foam EC 15\"x2 CGA     FT on foam w/ horiz HT   2x10 CGA     Low mat STS w/ feet on 2\" foam 2x10 CS 23\" table height; CS 2x10 CS 23\" table height;  1x10 22\" height  1x10 21\"height CGA  2x10 CS 23\" table height; CGA   Ther Ex 23' 20' 17'  23'   ROM/MMT        Sup ham stretch w/ SOS 20\"x3 R/L 20\"x3 R/L 20\"x3 R/L  20\"x3 R/L   Hip flexor stretch off edge 1'x1 R/L 1'x1 R/L 1'x1 R/L  1'x1 R/L   Seated HS curl  RTB 1x10 R/L  RTB 1x10 R/L       LAQ  1.5# 1x10 R/L alt  1.5# 1x10 R/L alt  2# 3\" 1x10 R/L     Hip abd in SL R/L     1x10 R/L    Rockerboard PD w/ rail for B/L gastroc stretch stand at rail 1x15 CS 1x15 CS 1x15 CS  1x15 CS   Stand alt hip abd vs TB RTB @ knees 1x10 R/L RTB @ shin 1x10 R/L RTB @ shin 1x10 R/L  RTB @ knees 1x10 R/L   Stand alt hip exten RTB @ knees 1x10 R/L RTB @ shin 1x10 R/L RTB @ shin 1x10 r/L  RTB @ knees 1x10 R/L   HEP        Ther Activity 5' 8' 8'     bridges 3\" 2x10  3\" 2x10  3\" 2x10  3\" 2x10   5xSTS; TUG        Fwd step ups 6\" step 1x10 R/L alt  6\" step 1x10 R/L alt  6\" step 1x10 R/L     Gait Training                        Modalities                                   "

## 2024-05-17 ENCOUNTER — OFFICE VISIT (OUTPATIENT)
Dept: PHYSICAL THERAPY | Facility: CLINIC | Age: 79
End: 2024-05-17
Payer: MEDICARE

## 2024-05-17 DIAGNOSIS — C90.01 MULTIPLE MYELOMA IN REMISSION (HCC): ICD-10-CM

## 2024-05-17 DIAGNOSIS — S22.008D OTHER FRACTURE OF UNSPECIFIED THORACIC VERTEBRA, SUBSEQUENT ENCOUNTER FOR FRACTURE WITH ROUTINE HEALING: Primary | ICD-10-CM

## 2024-05-17 PROCEDURE — 97110 THERAPEUTIC EXERCISES: CPT | Performed by: PHYSICAL THERAPIST

## 2024-05-17 PROCEDURE — 97112 NEUROMUSCULAR REEDUCATION: CPT | Performed by: PHYSICAL THERAPIST

## 2024-05-17 NOTE — PROGRESS NOTES
"Daily Note     Today's date: 2024  Patient name: Juve Livingston  : 1945  MRN: [id number]  Referring provider: Parmer, Lauren  Dx:   Encounter Diagnosis     ICD-10-CM    1. Other fracture of unspecified thoracic vertebra, subsequent encounter for fracture with routine healing  S22.008D       2. Multiple myeloma in remission (HCC)  C90.01                      Subjective: Pt states PT is going well. He reports \"a good work out today\".       Objective: See treatment diary below      Assessment: Tolerated treatment well and demonstrates improved ease w/ STS, and no need for any rests today during session (improving endurance) . Patient would benefit from continued PT and focus on multiple areas: strength, endurance, flexibility and balance.      Plan: Progress treatment as tolerated.       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  Access Code: [email or web address]  URL: [email or web address]  Date: 2024  Prepared by: Amelia Pandey    Exercises  - Supine Bridge  - 1 x daily - 2 sets - 5 reps  - Modified Kenroy Stretch  - 1 x daily - 2 reps - 1 min hold  - Hooklying Hamstring Stretch with Strap  - 1 x daily - 30 reps - 2 hold  - Standing Hip Abduction with Resistance at Ankles and Counter Support  - 1 x daily - 10 reps  - Standing Hip Extension with Resistance at Ankles and Unilateral Counter Support  - 1 x daily - 7 x weekly - 10 reps  - Heel Toe Raises with Counter Support  - 1 x daily - 7 x weekly - 1 sets - 10 reps  - Tandem Walking with Counter Support  - 1 x daily - 7 x weekly - 2 sets - 10 reps    SOC: 4/3/2024  Re: 2024  FOTO: 4/3/2024  POC EXP: 2024  DAILY TREATMENT LOG  date 2024    Visit #/auth 6 7 8 9    manual        Hip flexor stretch in SL                Neuro Re-Ed 10' 10' 20' 10'    FGA         ABC scale        Alt taps to step 6\" step 1x10 R/L CGA 6\" step 1x10 R/L CGA 6\" step 1x10 R/L CS 6\" step 1x10 R/L S    Tandem walk 2 laps " "near island counter CS no UE  2 laps near [place] counter CS no UE       Retro walk   25'x1 CS     FT W/ EC  On firm 20\" x3 CS/CGA  Feet apart on foam EC 15\"x2 CGA     FT on foam w/ horiz HT   2x10 CGA 2x10 CGA    Low mat STS w/ feet on 2\" foam 2x10 CS 23\" table height; CS 2x10 CS 23\" table height;  1x10 22\" height  1x10 21\"height CGA 2x10 21\" height    Ther Ex 23' 20' 17' 27'    Recumb bike     L1 40 RPM 3' for endurance    Sup ham stretch w/ SOS 20\"x3 R/L 20\"x3 R/L 20\"x3 R/L 20\"x3 R/L    Hip flexor stretch off edge 1'x1 R/L 1'x1 R/L 1'x1 R/L 1'x1 R/L    Seated HS curl  RTB 1x10 R/L  RTB 1x10 R/L       LAQ  1.5# 1x10 R/L alt  1.5# 1x10 R/L alt  2# 3\" 1x10 R/L 2# 3\" 2x10 r/L    Upright leg press    B/L 50# 2x10    Rockerboard PD w/ rail for B/L gastroc stretch stand at rail 1x15 CS 1x15 CS 1x15 CS 15x CS    Stand alt hip abd vs TB RTB @ knees 1x10 R/L RTB @ shin 1x10 R/L RTB @ shin 1x10 R/L RTB @ shin 1x10 R/L    Stand alt hip exten RTB @ knees 1x10 R/L RTB @ shin 1x10 R/L RTB @ shin 1x10 r/L RTB @ shin 1x10 R/L    HEP        Ther Activity 5' 8' 8' 8'    bridges 3\" 2x10  3\" 2x10  3\" 2x10 3\" 2x10    5xSTS; TUG        Fwd step ups 6\" step 1x10 R/L alt  6\" step 1x10 R/L alt  6\" step 1x10 R/L 6\" step 1x10 r/L w/ rail w/ alt knee hike    Gait Training                        Modalities                                     "

## 2024-05-20 ENCOUNTER — OFFICE VISIT (OUTPATIENT)
Dept: PHYSICAL THERAPY | Facility: CLINIC | Age: 79
End: 2024-05-20
Payer: MEDICARE

## 2024-05-20 DIAGNOSIS — S22.008D OTHER FRACTURE OF UNSPECIFIED THORACIC VERTEBRA, SUBSEQUENT ENCOUNTER FOR FRACTURE WITH ROUTINE HEALING: Primary | ICD-10-CM

## 2024-05-20 PROCEDURE — 97530 THERAPEUTIC ACTIVITIES: CPT | Performed by: PHYSICAL THERAPIST

## 2024-05-20 PROCEDURE — 97112 NEUROMUSCULAR REEDUCATION: CPT | Performed by: PHYSICAL THERAPIST

## 2024-05-20 PROCEDURE — 97110 THERAPEUTIC EXERCISES: CPT | Performed by: PHYSICAL THERAPIST

## 2024-05-20 NOTE — PROGRESS NOTES
"Daily Note     Today's date: 2024  Patient name: Juve Livingston  : 1945  MRN: [id number]  Referring provider: Parmer, Lauren  Dx:   Encounter Diagnosis     ICD-10-CM    1. Other fracture of unspecified thoracic vertebra, subsequent encounter for fracture with routine healing  S22.008D                      Subjective: Pt's wife comes into clinic to assist him w/ scheduling, she reports she is seeing progress w/ her .       Objective: See treatment diary below      Assessment: Tolerated treatment well and doesn't need rest periods during PT anymore . Patient would benefit from continued PT and progression of balance, endurance, strength and flexibility.      Plan: Progress treatment as tolerated.       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  Access Code: [email or web address]  URL: [email or web address]  Date: 2024  Prepared by: Amelia Pandey    Exercises  - Supine Bridge  - 1 x daily - 2 sets - 5 reps  - Modified Kenroy Stretch  - 1 x daily - 2 reps - 1 min hold  - Hooklying Hamstring Stretch with Strap  - 1 x daily - 30 reps - 2 hold  - Standing Hip Abduction with Resistance at Ankles and Counter Support  - 1 x daily - 10 reps  - Standing Hip Extension with Resistance at Ankles and Unilateral Counter Support  - 1 x daily - 7 x weekly - 10 reps  - Heel Toe Raises with Counter Support  - 1 x daily - 7 x weekly - 1 sets - 10 reps  - Tandem Walking with Counter Support  - 1 x daily - 7 x weekly - 2 sets - 10 reps    SOC: 4/3/2024  Re: 2024  FOTO: 4/3/2024  POC EXP: 2024  DAILY TREATMENT LOG  date  2024   Visit #/auth  7 8 9 10   manual                Neuro Re-Ed  10' 20' 10' 15'   FGA         ABC scale        Alt taps to step  6\" step 1x10 R/L CGA 6\" step 1x10 R/L CS 6\" step 1x10 R/L S To cone 1x10 R/L CGA   Tandem walk  2 laps near island counter CS no UE       Retro walk   25'x1 CS     FT W/ EC  On firm 20\" x3 CS/CGA  Feet apart on " "foam EC 15\"x2 CGA     FT on foam w/ horiz HT   2x10 CGA 2x10 CGA 1x10 CGA   Low mat STS w/ feet on 2\" foam  2x10 CS 23\" table height;  1x10 22\" height  1x10 21\"height CGA 2x10 21\" height 20\" height 2x10 CS   Fwd step up non-recip to foam     1x10 R/L CGA   Ther Ex  20' 17' 27' 22'   Recumb bike     L1 40 RPM 3' for endurance L1x3' 40 RPM for endurance   Sup ham stretch w/ SOS  20\"x3 R/L 20\"x3 R/L 20\"x3 R/L 20\"x3 R/L   Hip flexor stretch off edge  1'x1 R/L 1'x1 R/L 1'x1 R/L 1'x1 R/L   Seated HS curl   RTB 1x10 R/L       LAQ   1.5# 1x10 R/L alt  2# 3\" 1x10 R/L 2# 3\" 2x10 r/L 2# 3\" 2x10 R/L   Upright leg press    B/L 50# 2x10 B/L 55# 2x10   Rockerboard PD w/ rail for B/L gastroc stretch stand at rail  1x15 CS 1x15 CS 15x CS 20x CS   Stand alt hip abd vs TB  RTB @ shin 1x10 R/L RTB @ shin 1x10 R/L RTB @ shin 1x10 R/L RTB @ shin 1x10 R/L   Stand alt hip exten  RTB @ shin 1x10 R/L RTB @ shin 1x10 r/L RTB @ shin 1x10 R/L RTB @ shin 1x10 R/L   HEP        Ther Activity  8' 8' 8' 8'   bridges  3\" 2x10  3\" 2x10 3\" 2x10 3\" 2x10   5xSTS; TUG        Fwd step ups  6\" step 1x10 R/L alt  6\" step 1x10 R/L 6\" step 1x10 r/L w/ rail w/ alt knee hike 6\" step 1x10 r/L w/ alt knee hike   Gait Training                        Modalities                                       "

## 2024-05-23 ENCOUNTER — OFFICE VISIT (OUTPATIENT)
Dept: PHYSICAL THERAPY | Facility: CLINIC | Age: 79
End: 2024-05-23
Payer: MEDICARE

## 2024-05-23 DIAGNOSIS — S22.008D OTHER FRACTURE OF UNSPECIFIED THORACIC VERTEBRA, SUBSEQUENT ENCOUNTER FOR FRACTURE WITH ROUTINE HEALING: Primary | ICD-10-CM

## 2024-05-23 DIAGNOSIS — C90.01 MULTIPLE MYELOMA IN REMISSION (HCC): ICD-10-CM

## 2024-05-23 PROCEDURE — 97112 NEUROMUSCULAR REEDUCATION: CPT | Performed by: PHYSICAL THERAPIST

## 2024-05-23 PROCEDURE — 97110 THERAPEUTIC EXERCISES: CPT | Performed by: PHYSICAL THERAPIST

## 2024-05-23 NOTE — PROGRESS NOTES
"Daily Note     Today's date: 2024  Patient name: Juve Livingston  : 1945  MRN: [id number]  Referring provider: Parmer, Lauren  Dx:   Encounter Diagnosis     ICD-10-CM    1. Other fracture of unspecified thoracic vertebra, subsequent encounter for fracture with routine healing  S22.008D       2. Multiple myeloma in remission (HCC)  C90.01                      Subjective: I really think this is helping me      Objective: See treatment diary below      Assessment: Tolerated treatment well and demonstrates improved functional strength w/ STS and step ups. He continues w/ imbalance w/ retro walk and fwd ambulat w/head turns . Patient would benefit from continued PT and continued advancement of NMReed, functional activities, endurance and balance.      Plan: Progress treatment as tolerated.       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  Access Code: [email or web address]  URL: [email or web address]  Date: 2024  Prepared by: Amelia Pandey    Exercises  - Supine Bridge  - 1 x daily - 2 sets - 5 reps  - Modified Kenroy Stretch  - 1 x daily - 2 reps - 1 min hold  - Hooklying Hamstring Stretch with Strap  - 1 x daily - 30 reps - 2 hold  - Standing Hip Abduction with Resistance at Ankles and Counter Support  - 1 x daily - 10 reps  - Standing Hip Extension with Resistance at Ankles and Unilateral Counter Support  - 1 x daily - 7 x weekly - 10 reps  - Heel Toe Raises with Counter Support  - 1 x daily - 7 x weekly - 1 sets - 10 reps  - Tandem Walking with Counter Support  - 1 x daily - 7 x weekly - 2 sets - 10 reps    SOC: 4/3/2024  Re: 2024  FOTO: 4/3/2024  POC EXP: 2024  DAILY TREATMENT LOG  date 2024   Visit #/auth 11  8 9 10   manual                Neuro Re-Ed 25'  20' 10' 15'   FGA         ABC scale        Alt taps to step   6\" step 1x10 R/L CS 6\" step 1x10 R/L S To cone 1x10 R/L CGA   Ambulat w/ HT Horiz HT; 15'x3 R/L CGA       Retro walk 15'x3 " "CGA  25'x1 CS     FT W/ EC   Feet apart on foam EC 15\"x2 CGA     FT on foam w/ horiz HT   2x10 CGA 2x10 CGA 1x10 CGA   Low mat STS w/ feet on 2\" foam 2x10 CS 21\" mat w/ 2\" foam  1x10 22\" height  1x10 21\"height CGA 2x10 21\" height no foam 20\" height 2x10 CS no foam   Fwd step up non-recip to foam 4\" step +2\" foam 1x10 R/L CGA    1x10 R/L CGA   Ther Ex 15'  17' 27' 22'   Recumb bike  L1x4'; 45-50 RPM   L1 40 RPM 3' for endurance L1x3' 40 RPM for endurance   Sup ham stretch w/ SOS 20\"x3 R/L  20\"x3 R/L 20\"x3 R/L 20\"x3 R/L   Hip flexor stretch off edge 1'x1 R/L  1'x1 R/L 1'x1 R/L 1'x1 R/L   LAQ  2# 3\" 2x10 R/L  2# 3\" 1x10 R/L 2# 3\" 2x10 r/L 2# 3\" 2x10 R/L   Upright leg press B/L 55# 2x10   B/L 50# 2x10 B/L 55# 2x10   Rockerboard PD w/ rail for B/L gastroc stretch stand at rail 20x CS  1x15 CS 15x CS 20x CS   Stand alt hip abd vs TB   RTB @ shin 1x10 R/L RTB @ shin 1x10 R/L RTB @ shin 1x10 R/L   Stand alt hip exten   RTB @ shin 1x10 r/L RTB @ shin 1x10 R/L RTB @ shin 1x10 R/L   HEP        Ther Activity 5'  8' 8' 8'   bridges W TB abd grn 2x10  3\" 2x10 3\" 2x10 3\" 2x10   5xSTS; TUG        Fwd step ups   6\" step 1x10 R/L 6\" step 1x10 r/L w/ rail w/ alt knee hike 6\" step 1x10 r/L w/ alt knee hike   Gait Training                        Modalities                                         "

## 2024-05-28 ENCOUNTER — OFFICE VISIT (OUTPATIENT)
Dept: PHYSICAL THERAPY | Facility: CLINIC | Age: 79
End: 2024-05-28
Payer: MEDICARE

## 2024-05-28 DIAGNOSIS — S22.008D OTHER FRACTURE OF UNSPECIFIED THORACIC VERTEBRA, SUBSEQUENT ENCOUNTER FOR FRACTURE WITH ROUTINE HEALING: Primary | ICD-10-CM

## 2024-05-28 DIAGNOSIS — C90.01 MULTIPLE MYELOMA IN REMISSION (HCC): ICD-10-CM

## 2024-05-28 PROCEDURE — 97110 THERAPEUTIC EXERCISES: CPT | Performed by: PHYSICAL THERAPIST

## 2024-05-28 PROCEDURE — 97112 NEUROMUSCULAR REEDUCATION: CPT | Performed by: PHYSICAL THERAPIST

## 2024-05-28 NOTE — PROGRESS NOTES
Daily Note     Today's date: 2024  Patient name: Juve Livingston  : 1945  MRN: [id number]  Referring provider: Parmer, Lauren  Dx:   Encounter Diagnosis     ICD-10-CM    1. Other fracture of unspecified thoracic vertebra, subsequent encounter for fracture with routine healing  S22.008D       2. Multiple myeloma in remission (HCC)  C90.01                      Subjective: Pt reports feeling better. He reports not completing HEP due to having company this weekend.       Objective: Treatment and documentation completed by Milagros Ramirez SPT, direct supervision and review of documentation completed by Mary Alice Espinosa MPT, Cert MDT   See treatment diary below      Assessment: Tolerated treatment well. Pt demonstrated improvements in strength w/ STS and FSU during treatment today.  Pt continues to demonstrate balance, strength and endurance deficits. Pt demonstrates balance impairments w/ ambulation and head turns. Pt was educated on the importance of HEP to continue making progress with functional strength and balance since this is the 3rd time he reports not doing HEP due to company. Patient would benefit from continued PT to improve LE strength, balance and endurance.       Plan: Progress treatment as tolerated.       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  Access Code: [email or web address]  URL: [email or web address]  Date: 2024  Prepared by: Amelia Pandey    Exercises  - Supine Bridge  - 1 x daily - 2 sets - 5 reps  - Modified Kenroy Stretch  - 1 x daily - 2 reps - 1 min hold  - Hooklying Hamstring Stretch with Strap  - 1 x daily - 30 reps - 2 hold  - Standing Hip Abduction with Resistance at Ankles and Counter Support  - 1 x daily - 10 reps  - Standing Hip Extension with Resistance at Ankles and Unilateral Counter Support  - 1 x daily - 7 x weekly - 10 reps  - Heel Toe Raises with Counter Support  - 1 x daily - 7 x weekly - 1 sets - 10 reps  - Tandem Walking with Counter Support  - 1 x  "daily - 7 x weekly - 2 sets - 10 reps    SOC: 4/3/2024  Re: 4/29/2024  POC EXP: 6/26/2024  DAILY TREATMENT LOG  date 5/23/2024 5/28/2024 5/17/2024 5/20/2024   Visit #/auth 11 12  9 10   manual                Neuro Re-Ed 25' 20'  10' 15'   FGA         ABC scale        Alt taps to step    6\" step 1x10 R/L S To cone 1x10 R/L CGA   Ambulat w/ HT Horiz HT; 15'x3 R/L CGA Horiz HT; 16'x6 CGA       Retro walk 15'x3 CGA       FT W/ EC        FT on foam w/ horiz HT    2x10 CGA 1x10 CGA   Low mat STS w/ feet on 2\" foam 2x10 CS 21\" mat w/ 2\" foam 2x10 CS w/ foam   2x10 21\" height no foam 20\" height 2x10 CS no foam   Fwd step up non-recip to foam 4\" step +2\" foam 1x10 R/L CGA 4\" step +2\" foam 1x10 R/L CGA    1x10 R/L CGA   Ther Ex 15' 20'  27' 22'   Recumb bike  L1x4'; 45-50 RPM L1x4': 45-50 RPM  L1 40 RPM 3' for endurance L1x3' 40 RPM for endurance   Sup ham stretch w/ SOS 20\"x3 R/L 20\"x3 R/L   20\"x3 R/L 20\"x3 R/L   Hip flexor stretch off edge 1'x1 R/L 1'x1 R/L  1'x1 R/L 1'x1 R/L   LAQ  2# 3\" 2x10 R/L 2# 3\" 1x10 R/L; 1x10 on air pod R/L  2# 3\" 2x10 r/L 2# 3\" 2x10 R/L   Upright leg press B/L 55# 2x10 B/L 55# 2x10  B/L 50# 2x10 B/L 55# 2x10   Rockerboard PD w/ rail for B/L gastroc stretch stand at rail 20x CS 20x CS  15x CS 20x CS   Stand alt hip abd vs TB    RTB @ shin 1x10 R/L RTB @ shin 1x10 R/L   Stand alt hip exten    RTB @ shin 1x10 R/L RTB @ shin 1x10 R/L   HEP        Ther Activity 5' 5'  8' 8'   bridges W TB abd grn 2x10 W/ TB grn abd 2x10  3\" 2x10 3\" 2x10   5xSTS; TUG        Fwd step ups    6\" step 1x10 r/L w/ rail w/ alt knee hike 6\" step 1x10 r/L w/ alt knee hike   Gait Training                        Modalities                                           "

## 2024-05-30 ENCOUNTER — OFFICE VISIT (OUTPATIENT)
Dept: PHYSICAL THERAPY | Facility: CLINIC | Age: 79
End: 2024-05-30
Payer: MEDICARE

## 2024-05-30 DIAGNOSIS — S22.008D OTHER FRACTURE OF UNSPECIFIED THORACIC VERTEBRA, SUBSEQUENT ENCOUNTER FOR FRACTURE WITH ROUTINE HEALING: Primary | ICD-10-CM

## 2024-05-30 DIAGNOSIS — C90.01 MULTIPLE MYELOMA IN REMISSION (HCC): ICD-10-CM

## 2024-05-30 PROCEDURE — 97110 THERAPEUTIC EXERCISES: CPT | Performed by: PHYSICAL THERAPIST

## 2024-05-30 PROCEDURE — 97112 NEUROMUSCULAR REEDUCATION: CPT | Performed by: PHYSICAL THERAPIST

## 2024-05-30 NOTE — PROGRESS NOTES
Daily Note     Today's date: 2024  Patient name: Juve Livingston  : 1945  MRN: [id number]  Referring provider: Parmer, Lauren  Dx:   Encounter Diagnosis     ICD-10-CM    1. Other fracture of unspecified thoracic vertebra, subsequent encounter for fracture with routine healing  S22.008D       2. Multiple myeloma in remission (HCC)  C90.01                      Subjective: Pt reports feeling good today. Pt reports doing HEP yesterday and had no problems completing the exercises.       Objective: See treatment diary below. Treatment and documentation completed by Milagros Ramirez SPT, direct supervision and review of documentation completed by Mary Alice Espinosa MPT, Cert MDT.       Assessment: Tolerated treatment well and tolerated progression to exercises well. Pt showed improvements in strength and endurance w/ STS on foam. Pt continues to demonstrates decreased LE strength, activity tolerance and impaired balance. Pt was instructed to continuing doing HEP to continue improving in strength, endurance. Patient exhibited good technique with therapeutic exercises and would benefit from continued PT.      Plan: Progress treatment as tolerated.  Re-evaluation next week.       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  Access Code: [email or web address]  URL: [email or web address]  Date: 2024  Prepared by: Amelia Pandey    Exercises  - Supine Bridge  - 1 x daily - 2 sets - 5 reps  - Modified Kenroy Stretch  - 1 x daily - 2 reps - 1 min hold  - Hooklying Hamstring Stretch with Strap  - 1 x daily - 30 reps - 2 hold  - Standing Hip Abduction with Resistance at Ankles and Counter Support  - 1 x daily - 10 reps  - Standing Hip Extension with Resistance at Ankles and Unilateral Counter Support  - 1 x daily - 7 x weekly - 10 reps  - Heel Toe Raises with Counter Support  - 1 x daily - 7 x weekly - 1 sets - 10 reps  - Tandem Walking with Counter Support  - 1 x daily - 7 x weekly - 2 sets - 10 reps    SOC:  "4/3/2024  Re: 4/29/2024  POC EXP: 6/26/2024  DAILY TREATMENT LOG  date 5/23/2024 5/28/2024 5/30/2024 5/20/2024   Visit #/auth 11 12 13  10   manual                Neuro Re-Ed 25' 20' 20'  15'   FGA         ABC scale        Alt taps to step     To cone 1x10 R/L CGA   Ambulat w/ HT Horiz HT; 15'x3 R/L CGA Horiz HT; 16'x6 CGA  Horiz HT 16'x6     Retro walk 15'x3 CGA  15'x4     FT W/ EC        FT on foam w/ horiz HT     1x10 CGA   Low mat STS w/ feet on 2\" foam 2x10 CS 21\" mat w/ 2\" foam 2x10 CS w/ foam  2x10 CS w/ foam 21\" mat  20\" height 2x10 CS no foam   Fwd step up non-recip to foam 4\" step +2\" foam 1x10 R/L CGA 4\" step +2\" foam 1x10 R/L CGA  4\" step + 2\" foam 1x10 R/L CGA   1x10 R/L CGA   Ther Ex 15' 20' 20'  22'   Recumb bike  L1x4'; 45-50 RPM L1x4': 45-50 RPM L1 x5' 45-50 RPM   L1x3' 40 RPM for endurance   Sup ham stretch w/ SOS 20\"x3 R/L 20\"x3 R/L  20\"x3 R/L   20\"x3 R/L   Hip flexor stretch off edge 1'x1 R/L 1'x1 R/L 1'x1 R/L  1'x1 R/L   LAQ  2# 3\" 2x10 R/L 2# 3\" 1x10 R/L; 1x10 on air pod R/L 2# 3\" 1x10 R/L;2x on air pod   2# 3\" 2x10 R/L   Upright leg press B/L 55# 2x10 B/L 55# 2x10 B/L 55# 2x10  B/L 55# 2x10   Rockerboard PD w/ rail for B/L gastroc stretch stand at rail 20x CS 20x CS 20x CS  20x CS   Stand alt hip abd vs TB   RTB @ knees 1x10 R/L  RTB @ shin 1x10 R/L   Stand alt hip exten   RTB @ knees 1x10 R/L  RTB @ shin 1x10 R/L   HEP        Ther Activity 5' 5' 5'  8'   bridges W TB abd grn 2x10 W/ TB grn abd 2x10 W/ TB grn abd 2x10  3\" 2x10   5xSTS; TUG        Fwd step ups     6\" step 1x10 r/L w/ alt knee hike   Gait Training                        Modalities                                             "

## 2024-06-04 ENCOUNTER — EVALUATION (OUTPATIENT)
Dept: PHYSICAL THERAPY | Facility: CLINIC | Age: 79
End: 2024-06-04
Payer: MEDICARE

## 2024-06-04 DIAGNOSIS — S22.008D OTHER FRACTURE OF UNSPECIFIED THORACIC VERTEBRA, SUBSEQUENT ENCOUNTER FOR FRACTURE WITH ROUTINE HEALING: Primary | ICD-10-CM

## 2024-06-04 DIAGNOSIS — C90.01 MULTIPLE MYELOMA IN REMISSION (HCC): ICD-10-CM

## 2024-06-04 PROCEDURE — 97110 THERAPEUTIC EXERCISES: CPT | Performed by: PHYSICAL THERAPIST

## 2024-06-04 PROCEDURE — 97112 NEUROMUSCULAR REEDUCATION: CPT | Performed by: PHYSICAL THERAPIST

## 2024-06-04 PROCEDURE — 97530 THERAPEUTIC ACTIVITIES: CPT | Performed by: PHYSICAL THERAPIST

## 2024-06-04 NOTE — LETTER
2024      No Recipients    Patient: Juve Livingston   YOB: 1945   Date of Visit: 2024     Encounter Diagnosis     ICD-10-CM    1. Other fracture of unspecified thoracic vertebra, subsequent encounter for fracture with routine healing  S22.008D       2. Multiple myeloma in remission (HCC)  C90.01           Dear Dr. Bull:    Thank you for your recent referral of Juve Livingston. Please review the attached evaluation summary from Juve's recent visit.     Please verify that you agree with the plan of care by signing the attached order.     If you have any questions or concerns, please do not hesitate to call.     I sincerely appreciate the opportunity to share in the care of one of your patients and hope to have another opportunity to work with you in the near future.       Sincerely,    Mary Alice Espinosa, PT      Referring Provider:      I certify that I have read the below Plan of Care and certify the need for these services furnished under this plan of treatment while under my care.                    Christopher Bull MD  [address] [place], [place] [id number]  Via Fax: [phone]          PT Re-Evaluation     Today's date: 2024  Patient name: Juve Livingston  : 1945  MRN: [id number]  Referring provider: Christopher Bull MD  Dx:   Encounter Diagnosis     ICD-10-CM    1. Other fracture of unspecified thoracic vertebra, subsequent encounter for fracture with routine healing  S22.008D       2. Multiple myeloma in remission (HCC)  C90.01           Start Time: 1100  Stop Time: 1145  Total time in clinic (min): 45 minutes    Assessment  Impairments: abnormal or restricted ROM and impaired balance  Other impairment: poor tolerance to prolonged static positions    Assessment details: 2024: Pt returns today after 3 week break in care due to patient kimberli pneumonia. RE performed today due to prolonged illness to determine if any change in status. Pt demonstrates some decrease in  MMT, balance testing fairly consistent w/ IE, flexibility remains unchanged. Pt presentation fairly consistent w/ SOC 4/3/2024. Plan of care will remain unchanged. Pt endurance does seem a bit regressed, but pt remains a good candidate for skilled PT. He was encouraged to resume his HEP as he feels able. Pt continues w/ Rollator walker for outings and intermittent use indoors.    4/3/2024: Juve Livingston is a 78 y.o. male who presents 2 years post thoracic fusion with lumbar with pain, decreased strength, decreased ROM, decreased joint mobility, ambulatory dysfunction, postural dysfunction, balance dysfunction, and decreased endurance. Due to these impairments, patient has difficulty performing ADL's, recreational activities, engaging in social activities, ambulation, stair negotiation, lifting/carrying, transfers, reaching. Patient's clinical presentation is consistent with their referring diagnosis of thoracic compression fx (s/p fusion) and general deconditioning.   Patient has been educated in home exercise program and plan of care. Patient would benefit from skilled physical therapy services to address their aforementioned functional limitations and progress towards prior level of function and independence with home exercise program and self symptom management/resolution.     Understanding of Dx/Px/POC: good     Prognosis: good    Goals  Short Term Goals:  Target Date 5/1/2024  1. Initiate and advance HEP toward self symptom reduction/resolution.- not met - ill for 3 weeks  2. Improve hip flexor flexibility to allow pt to stand fully upright and to report improved static standing tolerance to >/=10 min w/ 0-2/10 LBP. - not met  3. Improve hamstring flexibility to 65 deg or better B/L to allow lumbar flexion to min robertson or better.- improved/not met  4. Improve LE strength such that pt can complete 5xSTS w/o need for UE assist. - not met  5. Pt to report at least 50% reduction in pain upon raising from prolonged  "sitting. - not met    Long Term Goals:  Target Date 7/26/2024 - all LTG's ongoing  1. Indep with HEP and self symptom prevention.  2. Improve ABC scale (w/ RW) to >/=75% confidence.  3. Improve LE strength to >/= 4/5 to allow ease w/ bridges, bed mobility and achieve 5xSTS w/o UE assist in 22 sec or less.  4. Improve balance such that pt can maintain SLS w/ EO x 5 sec or more R/L and achieve FGA score of 24/30 or better.  5. Pt to be able to ambulate community distances w/ SPC or no AD.       Plan  Patient would benefit from: skilled PT and PT eval  Planned modality interventions: thermotherapy: hydrocollator packs and unattended electrical stimulation    Planned therapy interventions: joint mobilization, patient education, postural training, abdominal trunk stabilization, functional ROM exercises, home exercise program, neuromuscular re-education, strengthening, stretching, therapeutic activities and therapeutic exercise  Other planned therapy interventions: mechanical assessment    Frequency: 2x week  Plan of Care beginning date: 6/4/2024  Plan of Care expiration date: 7/26/2024  Treatment plan discussed with: patient      Subjective Evaluation    History of Present Illness  Mechanism of injury: Subjective   6/4/2024:   4/29/2024: Pt returns to PT after 3 week break in care due to pneumonia (5th time he's had it). He reports he's been working on walking in his home, but has not kept up w/ his HEP during the past 3 weeks. He denies any increase in pain or new pain. He reports mild decrease in his endurance, but thinks he's \"doing pretty good considering I had pneumonia\". He reports no change in his back pain.    4/3/2024: Pt reports to PT for general deconditioning, imbalance, LE weakness and also reports some back pain. He had a compression fx at T3 and subsequently had thoracic fusion surgery T2-T6 in September 2021. He was sent to rehab, but returned to the hospital due to pneumonia. He was soon after dx w/ " melanoma. He underwent treatment for this and is currently in remission. He never completed rehab after his back surgery, citing multiple bouts of pneumonia, Covid and cancer treatments.   He c/o low back pain now, occurs w/ rising from prolonged sitting or standing; better as he walks. No UE or LE symptoms.   He c/o leg crams at night.   Pt has been using rollator walker for ambulation outside of his home since his back surgery. In his home, he ambulates w/o AD and denies holding onto furniture.  He arrives to PT w/ Rx from his surgeon Dr Ovalle for Thoracic fx rehab and from Oncologist Dr Wright for general conditioning. He states he will follow up w/ oncologist, but may not follow up w/ ortho surgeon.   Patient Goals  Patient goals for therapy: increased strength, decreased pain, independence with ADLs/IADLs and return to sport/leisure activities  Patient goal: less back pain; stronger legs  Pain  At best pain ratin  At worst pain ratin  Quality: dull ache and tight  Progression: no change    Treatments  Current treatment: physical therapy        Objective      MYOTOMAL TESTIN/4/2024 2024 2024 2024 4/3/2024 4/3/2024     Right  Left  Right  Left  Right  Left  L2-3 Hip flexion:    5/5  4+/5  4+/5  5/5  4+/5  L3-4 Knee extension: /  4+/5  4+/5  5/5  4+/5  L5 Great toe exten:  /5  5/5  5/5  5/5  5/5  L4 Heel walk/DF:  /5  5/5  5/5  5/5  5/5  S1 toe walk/PF/ever:  /  4/5  4/5  4/5  4/5  4/5  S2 knee flex:   4+/5  4+/5  4/5  4/5  4/5  4/5  Hip ER   5/5  5/5  4+/5  4+/5  4+/5  4+/5  Hip IR   5/5  5/5  5/5  5/5  4+/5  4+/5  Hip abd  5/5  5/5  4-/5  4-/5  4/5  4/5  Hip exten  4+/5  4+/5  4-/5  4-/5  3-/5  3+/5    LUMBAR AROM: 2024 2024 4/3/2024  Flexion   Mod  Mod  Mod robertson (FT mid shin)  Extension  Onel  Onel  onel 90% robertson  R sideglide  Mod  Mod  Mod 60% robertson  L sideglide  Mod  Onel  Onel 80% robertson      FUNCTION:  2024: Pt uses RW for outings for stability since  thor fusion 2 years ago - reports due to occasional lightheadedness from his usual meds   no difficulty w/ bridges/bed mobility   No remaining LBP w/ getting up from prolonged sitting and w/ static standing > 10 min   Quad lag B/L (knee ext qset/SLR): not tested today   Demonstrates flexed posture in standing (hip flexor tightness)   Limited forward bending due to hamstring tightness    4/29/2024: Pt is limited with ambulation w/o AD - uses RW for outings for stability since thor fusion 2 years ago   Mod difficulty w/ bridges/bed mobility   LBP w/ getting up from prolonged sitting and w/ static standing > 10 min   Quad lag B/L (knee ext qset/SLR): R +2/-4; L 0/-4   Demonstrates flexed posture in standing (hip flexor tightness)   Limited forward bending due to hamstring tightness  4/3/2024: Pt is limited with ambulation w/o AD - uses RW for outings for stability since thor fusion 2 years ago   Mod difficulty w/ bridges/bed mobility   LBP w/ getting up from prolonged sitting and w/ static standing > 5 min   Quad lag B/L (knee ext qset/SLR): R +2/-4; L 0/-4   Demonstrates flexed posture in standing (hip flexor tightness)   Limited forward bending due to hamstring tightness  FLEXIBILITY:  6/4/2024: hamstring flexibility 60 deg B/L   Quads WNL R 115; L 120 in prone  4/29/2024: hamsrting flexibility mod/tera robertson B/L 60 deg   Quads min robertson B/L prone knee flex R110/ L 120; hip flexors mod robertson B/L   4/3/2024: Hamstring flexibility tera robertson B/L 55 deg         Quad/hip flexor flexibitlity mod robertson R/min robertson L -  knee flex sup/prone R- 105/113; L 120/124        Pirif flexibility mod/tera robertson B/L       Outcome Measures Initial Eval  4/3/2024 RE  4/29/2024 RE   6/4/2024      5xSTS 35 sec minor use of hands 29.5 SEC min use hands; 7/10 fatigue 18 sec no use UE's      TUG 14.67 sec no AD 16 sec no AD 11.5 sec no AD      10 meter NT sec =  NT m/s        CAMPOS NT/56        FGA 13/30 4/5/2024 13/30 18/30      [email or web address]  - FTEO (firm)  - FTEC  (firm)  - FTEO (foam)  - FTEC (foam)   60+ sec  8 sec  60+ sec  3 sec   60+ sec  60+ sec  60+ sec  5 sec   60+sec  60+ sec  60+ sec  60+ sec      6MWT NT meters in NT min        SLS EO 1 sec R/L 1 sec R/L 2 sec R/L      ABC Scale 58.13% w/ Rollator 56.88% w/ rollator 71.25% w/ rollator           Cut off scores:  All data taken from [facility] or Rehab Measures    Blue: <46/56=falls risk  MDC: 6 pts  Age Norms:  60-69: M - 55   F - 55  70-79: M - 54   F - 53  80-89: M - 53   F - 50 5xSTS: Natan et al 2010  MDC: 2.3 sec  Age Norms:  60-69: 11.1 sec  70-79: 12.6 sec  80-89: 14.8 sec   TUG  MDC: 4.14 sec  Cut off score:  >13.5 sec community dwelling adults  >32.2 sec frail elderly  <20 sec: I for basic transfers  >30: dependent for transfers 10 Meter Walk Test Norms: Julieta and Raffy et al 2011  20-29: M - 1.35 m   F - 1.34 m  30-39: M - 1.43 m   F - 1.34 m  40-49: M - 1.43 m   F - 1.39 m  50-59: M - 1.43 m   F - 1.31 m  60-69: M - 1.34 m   F - 1.24 m  70-79: M - 1.26 m   F - 1.13 m  80-89: M - 0.97 m   F - 0.94 m   FGA  MCID: 4 pts  Geriatrics/community < 22/30 fall risk  Geriatrics/community < 20/30 unexplained falls DGI  MDC: vestibular - 4 pts  MDC: geriatric/community - 3 pts  Falls risk <19/24   6 Minute Walk Test  Age Norms  60-69: M - 1876 ft (571.80 m)  F - 1765 ft (537.98 m)  70-79: M - 1729 ft (527.00 m)  F - 1545 ft (470.92 m)  80-89: M - 1368 ft (416.97 m)  F - 1286 ft (391.97 m) mCTSIB  Norm: 20-60 yrs  Eyes open firm: norm sway 0.21-0.48  Eyes closed firm: norm sway 0.48-0.99  Eyes open foam: norm sway 0.38-0.71  Eyes closed foam: norm sway 0.70-2.22           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  Access Code: [email or web address]  URL: [email or web address]  Date: 05/06/2024  Prepared by: Amelia Pandey    Exercises  - Supine Bridge  - 1 x daily - 2 sets - 5 reps  - Modified Kenroy Stretch  - 1 x daily - 2 reps - 1 min hold  - Hooklying Hamstring Stretch with Strap  - 1 x daily  "- 30 reps - 2 hold  - Standing Hip Abduction with Resistance at Ankles and Counter Support  - 1 x daily - 10 reps  - Standing Hip Extension with Resistance at Ankles and Unilateral Counter Support  - 1 x daily - 7 x weekly - 10 reps  - Heel Toe Raises with Counter Support  - 1 x daily - 7 x weekly - 1 sets - 10 reps  - Tandem Walking with Counter Support  - 1 x daily - 7 x weekly - 2 sets - 10 reps    SOC: 4/3/2024  Re: 4/29/2024  POC EXP: 7/26/2024  DAILY TREATMENT LOG  date 5/23/2024 5/28/2024 5/30/2024 6/4/2024    Visit #/auth [id number] [phone]    manual    5'    ROM/MMT    TT    Neuro Re-Ed 25' 20' 20' 20'    FGA     TT    ABC scale    TT    Ambulat w/ HT Horiz HT; 15'x3 R/L CGA Horiz HT; 16'x6 CGA  Horiz HT 16'x6     Retro walk 15'x3 CGA  15'x4     FT W/ EC        FT on foam w/ horiz HT        Low mat STS w/ feet on 2\" foam 2x10 CS 21\" mat w/ 2\" foam 2x10 CS w/ foam  2x10 CS w/ foam 21\" mat     Fwd step up non-recip to foam 4\" step +2\" foam 1x10 R/L CGA 4\" step +2\" foam 1x10 R/L CGA  4\" step + 2\" foam 1x10 R/L CGA  6\" step no rail CGA 1x10 R/L    Ther Ex 15' 20' 20' 10'    Recumb bike  L1x4'; 45-50 RPM L1x4': 45-50 RPM L1 x5' 45-50 RPM      Sup ham stretch w/ SOS 20\"x3 R/L 20\"x3 R/L  20\"x3 R/L      Hip flexor stretch off edge 1'x1 R/L 1'x1 R/L 1'x1 R/L Prone b/L knee flex 1x15    LAQ  2# 3\" 2x10 R/L 2# 3\" 1x10 R/L; 1x10 on air pod R/L 2# 3\" 1x10 R/L;2x on air pod      Upright leg press B/L 55# 2x10 B/L 55# 2x10 B/L 55# 2x10 B/L 55# 2x12    Rockerboard PD w/ rail for B/L gastroc stretch stand at rail 20x CS 20x CS 20x CS 20x w cS    Stand alt hip abd vs TB   RTB @ knees 1x10 R/L     Stand alt hip exten   RTB @ knees 1x10 R/L     HEP        Ther Activity 5' 5' 5' 10'    bridges W TB abd grn 2x10 W/ TB grn abd 2x10 W/ TB grn abd 2x10 2x10 no TB    5xSTS; TUG    TT    Fwd step ups        Gait Training                        Modalities                                                       "

## 2024-06-04 NOTE — PROGRESS NOTES
PT Re-Evaluation     Today's date: 2024  Patient name: Juve Livingston  : 1945  MRN: [id number]  Referring provider: Parmer, Lauren  Dx:   Encounter Diagnosis     ICD-10-CM    1. Other fracture of unspecified thoracic vertebra, subsequent encounter for fracture with routine healing  S22.008D       2. Multiple myeloma in remission (HCC)  C90.01                      Assessment  Impairments: abnormal or restricted ROM and impaired balance  Other impairment: poor tolerance to prolonged static positions    Assessment details: 2024: Pt returns today after 3 week break in care due to patient kimberli pneumonia. RE performed today due to prolonged illness to determine if any change in status. Pt demonstrates some decrease in MMT, balance testing fairly consistent w/ IE, flexibility remains unchanged. Pt presentation fairly consistent w/ SOC 4/3/2024. Plan of care will remain unchanged. Pt endurance does seem a bit regressed, but pt remains a good candidate for skilled PT. He was encouraged to resume his HEP as he feels able. Pt continues w/ Rollator walker for outings and intermittent use indoors.    4/3/2024: Juve Livingston is a 78 y.o. male who presents 2 years post thoracic fusion with lumbar with pain, decreased strength, decreased ROM, decreased joint mobility, ambulatory dysfunction, postural dysfunction, balance dysfunction, and decreased endurance. Due to these impairments, patient has difficulty performing ADL's, recreational activities, engaging in social activities, ambulation, stair negotiation, lifting/carrying, transfers, reaching. Patient's clinical presentation is consistent with their referring diagnosis of thoracic compression fx (s/p fusion) and general deconditioning.   Patient has been educated in home exercise program and plan of care. Patient would benefit from skilled physical therapy services to address their aforementioned functional limitations and progress towards prior  level of function and independence with home exercise program and self symptom management/resolution.     Understanding of Dx/Px/POC: good     Prognosis: good    Goals  Short Term Goals:  Target Date 5/1/2024  1. Initiate and advance HEP toward self symptom reduction/resolution.- not met - ill for 3 weeks  2. Improve hip flexor flexibility to allow pt to stand fully upright and to report improved static standing tolerance to >/=10 min w/ 0-2/10 LBP. - not met  3. Improve hamstring flexibility to 65 deg or better B/L to allow lumbar flexion to min robertson or better.- improved/not met  4. Improve LE strength such that pt can complete 5xSTS w/o need for UE assist. - not met  5. Pt to report at least 50% reduction in pain upon raising from prolonged sitting. - not met    Long Term Goals:  Target Date 6/26/2024 - all LTG's ongoing  1. Indep with HEP and self symptom prevention.  2. Improve ABC scale (w/ RW) to >/=75% confidence.  3. Improve LE strength to >/= 4/5 to allow ease w/ bridges, bed mobility and achieve 5xSTS w/o UE assist in 22 sec or less.  4. Improve balance such that pt can maintain SLS w/ EO x 5 sec or more R/L and achieve FGA score of 24/30 or better.  5. Pt to be able to ambulate community distances w/ SPC or no AD.       Plan  Patient would benefit from: skilled PT and PT eval  Planned modality interventions: thermotherapy: hydrocollator packs and unattended electrical stimulation    Planned therapy interventions: joint mobilization, patient education, postural training, abdominal trunk stabilization, functional ROM exercises, home exercise program, neuromuscular re-education, strengthening, stretching, therapeutic activities and therapeutic exercise  Other planned therapy interventions: mechanical assessment    Frequency: 2x week  Duration in weeks: 8  Treatment plan discussed with: patient    Subjective Evaluation    History of Present Illness  Mechanism of injury: Subjective   6/4/2024:   4/29/2024: Pt  "returns to PT after 3 week break in care due to pneumonia (5th time he's had it). He reports he's been working on walking in his home, but has not kept up w/ his HEP during the past 3 weeks. He denies any increase in pain or new pain. He reports mild decrease in his endurance, but thinks he's \"doing pretty good considering I had pneumonia\". He reports no change in his back pain.    4/3/2024: Pt reports to PT for general deconditioning, imbalance, LE weakness and also reports some back pain. He had a compression fx at T3 and subsequently had thoracic fusion surgery T2-T6 in 2021. He was sent to rehab, but returned to the hospital due to pneumonia. He was soon after dx w/ melanoma. He underwent treatment for this and is currently in remission. He never completed rehab after his back surgery, citing multiple bouts of pneumonia, Covid and cancer treatments.   He c/o low back pain now, occurs w/ rising from prolonged sitting or standing; better as he walks. No UE or LE symptoms.   He c/o leg crams at night.   Pt has been using rollator walker for ambulation outside of his home since his back surgery. In his home, he ambulates w/o AD and denies holding onto furniture.  He arrives to PT w/ Rx from his surgeon Dr Ovalle for Thoracic fx rehab and from Oncologist Dr Wright for general conditioning. He states he will follow up w/ oncologist, but may not follow up w/ ortho surgeon.   Patient Goals  Patient goals for therapy: increased strength, decreased pain, independence with ADLs/IADLs and return to sport/leisure activities  Patient goal: less back pain; stronger legs  Pain  At best pain ratin  At worst pain ratin  Quality: dull ache and tight  Progression: no change    Treatments  Current treatment: physical therapy      Objective      MYOTOMAL TESTIN/4/2024 2024 2024 2024 4/3/2024 4/3/2024     Right  Left  Right  Left  Right  Left  L2-3 Hip flexion: "  5/5  5/5  4+/5  4+/5  5/5  4+/5  L3-4 Knee extension: 5/5  5/5  4+/5  4+/5  5/5  4+/5  L5 Great toe exten:  5/5 5/5  5/5  5/5  5/5  5/5  L4 Heel walk/DF:  5/5 5/5  5/5  5/5  5/5  5/5  S1 toe walk/PF/ever:  4/5 4/5  4/5  4/5  4/5  4/5  S2 knee flex:   4+/5  4+/5  4/5  4/5  4/5  4/5  Hip ER   5/5 5/5  4+/5  4+/5  4+/5  4+/5  Hip IR   5/5 5/5 5/5 5/5  4+/5  4+/5  Hip abd  5/5 5/5  4-/5  4-/5  4/5  4/5  Hip exten  4+/5  4+/5  4-/5  4-/5  3-/5  3+/5    LUMBAR AROM: 6/4/2024 4/29/2024 4/3/2024  Flexion   Mod  Mod  Mod robertson (FT mid shin)  Extension  Onel  Onel  onel 90% robertson  R sideglide  Mod  Mod  Mod 60% robertson  L sideglide  Mod  Onel  Onel 80% robertson      FUNCTION:  6/4/2024: Pt uses RW for outings for stability since thor fusion 2 years ago - reports due to occasional lightheadedness from his usual meds   no difficulty w/ bridges/bed mobility   No remaining LBP w/ getting up from prolonged sitting and w/ static standing > 10 min   Quad lag B/L (knee ext qset/SLR): not tested today   Demonstrates flexed posture in standing (hip flexor tightness)   Limited forward bending due to hamstring tightness    4/29/2024: Pt is limited with ambulation w/o AD - uses RW for outings for stability since thor fusion 2 years ago   Mod difficulty w/ bridges/bed mobility   LBP w/ getting up from prolonged sitting and w/ static standing > 10 min   Quad lag B/L (knee ext qset/SLR): R +2/-4; L 0/-4   Demonstrates flexed posture in standing (hip flexor tightness)   Limited forward bending due to hamstring tightness  4/3/2024: Pt is limited with ambulation w/o AD - uses RW for outings for stability since thor fusion 2 years ago   Mod difficulty w/ bridges/bed mobility   LBP w/ getting up from prolonged sitting and w/ static standing > 5 min   Quad lag B/L (knee ext qset/SLR): R +2/-4; L 0/-4   Demonstrates flexed posture in standing (hip flexor tightness)   Limited forward bending due to hamstring tightness  FLEXIBILITY:  6/4/2024: hamstring  flexibility 60 deg B/L   Quads WNL R 115; L 120 in prone  4/29/2024: hamsrting flexibility mod/tera robertson B/L 60 deg   Quads min robertson B/L prone knee flex R110/ L 120; hip flexors mod robertson B/L   4/3/2024: Hamstring flexibility tera robertson B/L 55 deg         Quad/hip flexor flexibitlity mod robertson R/min robertson L -  knee flex sup/prone R- 105/113; L 120/124        Pirif flexibility mod/tera robertson B/L       Outcome Measures Initial Eval  4/3/2024 RE  4/29/2024 RE   6/4/2024      5xSTS 35 sec minor use of hands 29.5 SEC min use hands; 7/10 fatigue 18 sec no use UE's      TUG 14.67 sec no AD 16 sec no AD 11.5 sec no AD      10 meter NT sec =  NT m/s        CAMPOS NT/56        FGA 13/30 4/5/2024 13/30 18/30      mCTSIB  - FTEO (firm)  - FTEC (firm)  - FTEO (foam)  - FTEC (foam)   60+ sec  8 sec  60+ sec  3 sec   60+ sec  60+ sec  60+ sec  5 sec   60+sec  60+ sec  60+ sec  60+ sec      6MWT NT meters in NT min        SLS EO 1 sec R/L 1 sec R/L 2 sec R/L      ABC Scale 58.13% w/ Rollator 56.88% w/ rollator 71.25% w/ rollator           Cut off scores:  All data taken from [facility] or Rehab Measures    Campos: <46/56=falls risk  MDC: 6 pts  Age Norms:  60-69: M - 55   F - 55  70-79: M - 54   F - 53  80-89: M - 53   F - 50 5xSTS: Natan et al 2010  MDC: 2.3 sec  Age Norms:  60-69: 11.1 sec  70-79: 12.6 sec  80-89: 14.8 sec   TUG  MDC: 4.14 sec  Cut off score:  >13.5 sec community dwelling adults  >32.2 sec frail elderly  <20 sec: I for basic transfers  >30: dependent for transfers 10 Meter Walk Test Norms: Ashley et al 2011  20-29: M - 1.35 m   F - 1.34 m  30-39: M - 1.43 m   F - 1.34 m  40-49: M - 1.43 m   F - 1.39 m  50-59: M - 1.43 m   F - 1.31 m  60-69: M - 1.34 m   F - 1.24 m  70-79: M - 1.26 m   F - 1.13 m  80-89: M - 0.97 m   F - 0.94 m   FGA  MCID: 4 pts  Geriatrics/community < 22/30 fall risk  Geriatrics/community < 20/30 unexplained falls DGI  MDC: vestibular - 4 pts  MDC: geriatric/community - 3 pts  Falls risk <19/24  "  6 Minute Walk Test  Age Norms  60-69: M - 1876 ft (571.80 m)  F - 1765 ft (537.98 m)  70-79: M - 1729 ft (527.00 m)  F - 1545 ft (470.92 m)  80-89: M - 1368 ft (416.97 m)  F - 1286 ft (391.97 m) mCTSIB  Norm: 20-60 yrs  Eyes open firm: norm sway 0.21-0.48  Eyes closed firm: norm sway 0.48-0.99  Eyes open foam: norm sway 0.38-0.71  Eyes closed foam: norm sway 0.70-2.22           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  Access Code: [email or web address]  URL: [email or web address]  Date: 05/06/2024  Prepared by: Amelia Pandey    Exercises  - Supine Bridge  - 1 x daily - 2 sets - 5 reps  - Modified Kenroy Stretch  - 1 x daily - 2 reps - 1 min hold  - Hooklying Hamstring Stretch with Strap  - 1 x daily - 30 reps - 2 hold  - Standing Hip Abduction with Resistance at Ankles and Counter Support  - 1 x daily - 10 reps  - Standing Hip Extension with Resistance at Ankles and Unilateral Counter Support  - 1 x daily - 7 x weekly - 10 reps  - Heel Toe Raises with Counter Support  - 1 x daily - 7 x weekly - 1 sets - 10 reps  - Tandem Walking with Counter Support  - 1 x daily - 7 x weekly - 2 sets - 10 reps    SOC: 4/3/2024  Re: 4/29/2024  POC EXP: 6/26/2024  DAILY TREATMENT LOG  date 5/23/2024 5/28/2024 5/30/2024 6/4/2024    Visit #/auth [id number] [phone]    manual    5'    ROM/MMT    TT    Neuro Re-Ed 25' 20' 20' 20'    FGA     TT    ABC scale    TT    Ambulat w/ HT Horiz HT; 15'x3 R/L CGA Horiz HT; 16'x6 CGA  Horiz HT 16'x6     Retro walk 15'x3 CGA  15'x4     FT W/ EC        FT on foam w/ horiz HT        Low mat STS w/ feet on 2\" foam 2x10 CS 21\" mat w/ 2\" foam 2x10 CS w/ foam  2x10 CS w/ foam 21\" mat     Fwd step up non-recip to foam 4\" step +2\" foam 1x10 R/L CGA 4\" step +2\" foam 1x10 R/L CGA  4\" step + 2\" foam 1x10 R/L CGA  6\" step no rail CGA 1x10 R/L    Ther Ex 15' 20' 20' 10'    Recumb bike  L1x4'; 45-50 RPM L1x4': 45-50 RPM L1 x5' 45-50 RPM      Sup ham stretch w/ SOS 20\"x3 R/L 20\"x3 R/L  20\"x3 R/L      Hip " "flexor stretch off edge 1'x1 R/L 1'x1 R/L 1'x1 R/L Prone b/L knee flex 1x15    LAQ  2# 3\" 2x10 R/L 2# 3\" 1x10 R/L; 1x10 on air pod R/L 2# 3\" 1x10 R/L;2x on air pod      Upright leg press B/L 55# 2x10 B/L 55# 2x10 B/L 55# 2x10 B/L 55# 2x12    Rockerboard PD w/ rail for B/L gastroc stretch stand at rail 20x CS 20x CS 20x CS 20x w cS    Stand alt hip abd vs TB   RTB @ knees 1x10 R/L     Stand alt hip exten   RTB @ knees 1x10 R/L     HEP        Ther Activity 5' 5' 5' 10'    bridges W TB abd grn 2x10 W/ TB grn abd 2x10 W/ TB grn abd 2x10 2x10 no TB    5xSTS; TUG    TT    Fwd step ups        Gait Training                        Modalities                                       "

## 2024-06-04 NOTE — LETTER
Ebony 10, 2024    Lauren Parmer  [address] [facility] [address]  [facility] [address]    Patient: Juve Livingston   YOB: 1945   Date of Visit: 2024     Encounter Diagnosis     ICD-10-CM    1. Other fracture of unspecified thoracic vertebra, subsequent encounter for fracture with routine healing  S22.008D       2. Multiple myeloma in remission (HCC)  C90.01           Dear Dr. Parmer:    Thank you for your recent referral of Juve Livingston. Please review the attached evaluation summary from Juve's recent visit.     Please verify that you agree with the plan of care by signing the attached order.     If you have any questions or concerns, please do not hesitate to call.     I sincerely appreciate the opportunity to share in the care of one of your patients and hope to have another opportunity to work with you in the near future.       Sincerely,    Mary Alice Espinosa, PT      Referring Provider:      I certify that I have read the below Plan of Care and certify the need for these services furnished under this plan of treatment while under my care.                    Lauren Parmer  [address] [address]  Via Fax: [phone]          PT Re-Evaluation     Today's date: 2024  Patient name: Juve Livingston  : 1945  MRN: [id number]  Referring provider: Parmer, Lauren  Dx:   Encounter Diagnosis     ICD-10-CM    1. Other fracture of unspecified thoracic vertebra, subsequent encounter for fracture with routine healing  S22.008D       2. Multiple myeloma in remission (HCC)  C90.01                      Assessment  Impairments: abnormal or restricted ROM and impaired balance  Other impairment: poor tolerance to prolonged static positions    Assessment details: 2024: Pt returns today after 3 week break in care due to patient kimberli pneumonia. RE performed today due to prolonged illness to determine if any change in status. Pt demonstrates some decrease in  MMT, balance testing fairly consistent w/ IE, flexibility remains unchanged. Pt presentation fairly consistent w/ SOC 4/3/2024. Plan of care will remain unchanged. Pt endurance does seem a bit regressed, but pt remains a good candidate for skilled PT. He was encouraged to resume his HEP as he feels able. Pt continues w/ Rollator walker for outings and intermittent use indoors.    4/3/2024: Juve Livingston is a 78 y.o. male who presents 2 years post thoracic fusion with lumbar with pain, decreased strength, decreased ROM, decreased joint mobility, ambulatory dysfunction, postural dysfunction, balance dysfunction, and decreased endurance. Due to these impairments, patient has difficulty performing ADL's, recreational activities, engaging in social activities, ambulation, stair negotiation, lifting/carrying, transfers, reaching. Patient's clinical presentation is consistent with their referring diagnosis of thoracic compression fx (s/p fusion) and general deconditioning.   Patient has been educated in home exercise program and plan of care. Patient would benefit from skilled physical therapy services to address their aforementioned functional limitations and progress towards prior level of function and independence with home exercise program and self symptom management/resolution.     Understanding of Dx/Px/POC: good     Prognosis: good    Goals  Short Term Goals:  Target Date 5/1/2024  1. Initiate and advance HEP toward self symptom reduction/resolution.- not met - ill for 3 weeks  2. Improve hip flexor flexibility to allow pt to stand fully upright and to report improved static standing tolerance to >/=10 min w/ 0-2/10 LBP. - not met  3. Improve hamstring flexibility to 65 deg or better B/L to allow lumbar flexion to min robertson or better.- improved/not met  4. Improve LE strength such that pt can complete 5xSTS w/o need for UE assist. - not met  5. Pt to report at least 50% reduction in pain upon raising from prolonged  "sitting. - not met    Long Term Goals:  Target Date 7/26/2024 - all LTG's ongoing  1. Indep with HEP and self symptom prevention.  2. Improve ABC scale (w/ RW) to >/=75% confidence.  3. Improve LE strength to >/= 4/5 to allow ease w/ bridges, bed mobility and achieve 5xSTS w/o UE assist in 22 sec or less.  4. Improve balance such that pt can maintain SLS w/ EO x 5 sec or more R/L and achieve FGA score of 24/30 or better.  5. Pt to be able to ambulate community distances w/ SPC or no AD.       Plan  Patient would benefit from: skilled PT and PT eval  Planned modality interventions: thermotherapy: hydrocollator packs and unattended electrical stimulation    Planned therapy interventions: joint mobilization, patient education, postural training, abdominal trunk stabilization, functional ROM exercises, home exercise program, neuromuscular re-education, strengthening, stretching, therapeutic activities and therapeutic exercise  Other planned therapy interventions: mechanical assessment    Frequency: 2x week  Plan of Care beginning date: 6/4/2024  Plan of Care expiration date: 7/26/2024  Treatment plan discussed with: patient    Subjective Evaluation    History of Present Illness  Mechanism of injury: Subjective   6/4/2024:   4/29/2024: Pt returns to PT after 3 week break in care due to pneumonia (5th time he's had it). He reports he's been working on walking in his home, but has not kept up w/ his HEP during the past 3 weeks. He denies any increase in pain or new pain. He reports mild decrease in his endurance, but thinks he's \"doing pretty good considering I had pneumonia\". He reports no change in his back pain.    4/3/2024: Pt reports to PT for general deconditioning, imbalance, LE weakness and also reports some back pain. He had a compression fx at T3 and subsequently had thoracic fusion surgery T2-T6 in September 2021. He was sent to rehab, but returned to the hospital due to pneumonia. He was soon after dx w/ " melanoma. He underwent treatment for this and is currently in remission. He never completed rehab after his back surgery, citing multiple bouts of pneumonia, Covid and cancer treatments.   He c/o low back pain now, occurs w/ rising from prolonged sitting or standing; better as he walks. No UE or LE symptoms.   He c/o leg crams at night.   Pt has been using rollator walker for ambulation outside of his home since his back surgery. In his home, he ambulates w/o AD and denies holding onto furniture.  He arrives to PT w/ Rx from his surgeon Dr Ovalle for Thoracic fx rehab and from Oncologist Dr Wright for general conditioning. He states he will follow up w/ oncologist, but may not follow up w/ ortho surgeon.   Patient Goals  Patient goals for therapy: increased strength, decreased pain, independence with ADLs/IADLs and return to sport/leisure activities  Patient goal: less back pain; stronger legs  Pain  At best pain ratin  At worst pain ratin  Quality: dull ache and tight  Progression: no change    Treatments  Current treatment: physical therapy      Objective      MYOTOMAL TESTIN/4/2024 2024 2024 2024 4/3/2024 4/3/2024     Right  Left  Right  Left  Right  Left  L2-3 Hip flexion:    5/5  4+/5  4+/5  5/5  4+/5  L3-4 Knee extension: /  4+/5  4+/5  5/5  4+/5  L5 Great toe exten:  /5  5/5  5/5  5/5  5/5  L4 Heel walk/DF:  /5  5/5  5/5  5/5  5/5  S1 toe walk/PF/ever:  /  4/5  4/5  4/5  4/5  4/5  S2 knee flex:   4+/5  4+/5  4/5  4/5  4/5  4/5  Hip ER   5/5  5/5  4+/5  4+/5  4+/5  4+/5  Hip IR   5/5  5/5  5/5  5/5  4+/5  4+/5  Hip abd  5/5  5/5  4-/5  4-/5  4/5  4/5  Hip exten  4+/5  4+/5  4-/5  4-/5  3-/5  3+/5    LUMBAR AROM: 2024 2024 4/3/2024  Flexion   Mod  Mod  Mod robertson (FT mid shin)  Extension  Onel  Onel  onel 90% robertson  R sideglide  Mod  Mod  Mod 60% robertson  L sideglide  Mod  Onel  Onel 80% robertson      FUNCTION:  2024: Pt uses RW for outings for stability since  thor fusion 2 years ago - reports due to occasional lightheadedness from his usual meds   no difficulty w/ bridges/bed mobility   No remaining LBP w/ getting up from prolonged sitting and w/ static standing > 10 min   Quad lag B/L (knee ext qset/SLR): not tested today   Demonstrates flexed posture in standing (hip flexor tightness)   Limited forward bending due to hamstring tightness    4/29/2024: Pt is limited with ambulation w/o AD - uses RW for outings for stability since thor fusion 2 years ago   Mod difficulty w/ bridges/bed mobility   LBP w/ getting up from prolonged sitting and w/ static standing > 10 min   Quad lag B/L (knee ext qset/SLR): R +2/-4; L 0/-4   Demonstrates flexed posture in standing (hip flexor tightness)   Limited forward bending due to hamstring tightness  4/3/2024: Pt is limited with ambulation w/o AD - uses RW for outings for stability since thor fusion 2 years ago   Mod difficulty w/ bridges/bed mobility   LBP w/ getting up from prolonged sitting and w/ static standing > 5 min   Quad lag B/L (knee ext qset/SLR): R +2/-4; L 0/-4   Demonstrates flexed posture in standing (hip flexor tightness)   Limited forward bending due to hamstring tightness  FLEXIBILITY:  6/4/2024: hamstring flexibility 60 deg B/L   Quads WNL R 115; L 120 in prone  4/29/2024: hamsrting flexibility mod/tera robertson B/L 60 deg   Quads min robertson B/L prone knee flex R110/ L 120; hip flexors mod robertson B/L   4/3/2024: Hamstring flexibility tera robertson B/L 55 deg         Quad/hip flexor flexibitlity mod robertson R/min robertson L -  knee flex sup/prone R- 105/113; L 120/124        Pirif flexibility mod/tera robertson B/L       Outcome Measures Initial Eval  4/3/2024 RE  4/29/2024 RE   6/4/2024      5xSTS 35 sec minor use of hands 29.5 SEC min use hands; 7/10 fatigue 18 sec no use UE's      TUG 14.67 sec no AD 16 sec no AD 11.5 sec no AD      10 meter NT sec =  NT m/s        CAMPOS NT/56        FGA 13/30 4/5/2024 13/30 18/30      mCTSERGEB  - FTEO (firm)  - FTEC  (firm)  - FTEO (foam)  - FTEC (foam)   60+ sec  8 sec  60+ sec  3 sec   60+ sec  60+ sec  60+ sec  5 sec   60+sec  60+ sec  60+ sec  60+ sec      6MWT NT meters in NT min        SLS EO 1 sec R/L 1 sec R/L 2 sec R/L      ABC Scale 58.13% w/ Rollator 56.88% w/ rollator 71.25% w/ rollator           Cut off scores:  All data taken from [facility] or Rehab Measures    Blue: <46/56=falls risk  MDC: 6 pts  Age Norms:  60-69: M - 55   F - 55  70-79: M - 54   F - 53  80-89: M - 53   F - 50 5xSTS: Natan et al 2010  MDC: 2.3 sec  Age Norms:  60-69: 11.1 sec  70-79: 12.6 sec  80-89: 14.8 sec   TUG  MDC: 4.14 sec  Cut off score:  >13.5 sec community dwelling adults  >32.2 sec frail elderly  <20 sec: I for basic transfers  >30: dependent for transfers 10 Meter Walk Test Norms: Julieta and Raffy et al 2011  20-29: M - 1.35 m   F - 1.34 m  30-39: M - 1.43 m   F - 1.34 m  40-49: M - 1.43 m   F - 1.39 m  50-59: M - 1.43 m   F - 1.31 m  60-69: M - 1.34 m   F - 1.24 m  70-79: M - 1.26 m   F - 1.13 m  80-89: M - 0.97 m   F - 0.94 m   FGA  MCID: 4 pts  Geriatrics/community < 22/30 fall risk  Geriatrics/community < 20/30 unexplained falls DGI  MDC: vestibular - 4 pts  MDC: geriatric/community - 3 pts  Falls risk <19/24   6 Minute Walk Test  Age Norms  60-69: M - 1876 ft (571.80 m)  F - 1765 ft (537.98 m)  70-79: M - 1729 ft (527.00 m)  F - 1545 ft (470.92 m)  80-89: M - 1368 ft (416.97 m)  F - 1286 ft (391.97 m) mCTSIB  Norm: 20-60 yrs  Eyes open firm: norm sway 0.21-0.48  Eyes closed firm: norm sway 0.48-0.99  Eyes open foam: norm sway 0.38-0.71  Eyes closed foam: norm sway 0.70-2.22           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  Access Code: [email or web address]  URL: [email or web address]  Date: 05/06/2024  Prepared by: Amelia Pandey    Exercises  - Supine Bridge  - 1 x daily - 2 sets - 5 reps  - Modified Kenroy Stretch  - 1 x daily - 2 reps - 1 min hold  - Hooklying Hamstring Stretch with Strap  - 1 x daily  "- 30 reps - 2 hold  - Standing Hip Abduction with Resistance at Ankles and Counter Support  - 1 x daily - 10 reps  - Standing Hip Extension with Resistance at Ankles and Unilateral Counter Support  - 1 x daily - 7 x weekly - 10 reps  - Heel Toe Raises with Counter Support  - 1 x daily - 7 x weekly - 1 sets - 10 reps  - Tandem Walking with Counter Support  - 1 x daily - 7 x weekly - 2 sets - 10 reps    SOC: 4/3/2024  Re: 4/29/2024  POC EXP: 7/26/2024  DAILY TREATMENT LOG  date 5/23/2024 5/28/2024 5/30/2024 6/4/2024    Visit #/auth 11 12 13 14    manual    5'    ROM/MMT    TT    Neuro Re-Ed 25' 20' 20' 20'    FGA     TT    ABC scale    TT    Ambulat w/ HT Horiz HT; 15'x3 R/L CGA Horiz HT; 16'x6 CGA  Horiz HT 16'x6     Retro walk 15'x3 CGA  15'x4     FT W/ EC        FT on foam w/ horiz HT        Low mat STS w/ feet on 2\" foam 2x10 CS 21\" mat w/ 2\" foam 2x10 CS w/ foam  2x10 CS w/ foam 21\" mat     Fwd step up non-recip to foam 4\" step +2\" foam 1x10 R/L CGA 4\" step +2\" foam 1x10 R/L CGA  4\" step + 2\" foam 1x10 R/L CGA  6\" step no rail CGA 1x10 R/L    Ther Ex 15' 20' 20' 10'    Recumb bike  L1x4'; 45-50 RPM L1x4': 45-50 RPM L1 x5' 45-50 RPM      Sup ham stretch w/ SOS 20\"x3 R/L 20\"x3 R/L  20\"x3 R/L      Hip flexor stretch off edge 1'x1 R/L 1'x1 R/L 1'x1 R/L Prone b/L knee flex 1x15    LAQ  2# 3\" 2x10 R/L 2# 3\" 1x10 R/L; 1x10 on air pod R/L 2# 3\" 1x10 R/L;2x on air pod      Upright leg press B/L 55# 2x10 B/L 55# 2x10 B/L 55# 2x10 B/L 55# 2x12    Rockerboard PD w/ rail for B/L gastroc stretch stand at rail 20x CS 20x CS 20x CS 20x w cS    Stand alt hip abd vs TB   RTB @ knees 1x10 R/L     Stand alt hip exten   RTB @ knees 1x10 R/L     HEP        Ther Activity 5' 5' 5' 10'    bridges W TB abd grn 2x10 W/ TB grn abd 2x10 W/ TB grn abd 2x10 2x10 no TB    5xSTS; TUG    TT    Fwd step ups        Gait Training                        Modalities                                                       "

## 2024-06-07 ENCOUNTER — OFFICE VISIT (OUTPATIENT)
Dept: PHYSICAL THERAPY | Facility: CLINIC | Age: 79
End: 2024-06-07
Payer: MEDICARE

## 2024-06-07 DIAGNOSIS — S22.008D OTHER FRACTURE OF UNSPECIFIED THORACIC VERTEBRA, SUBSEQUENT ENCOUNTER FOR FRACTURE WITH ROUTINE HEALING: Primary | ICD-10-CM

## 2024-06-07 DIAGNOSIS — C90.01 MULTIPLE MYELOMA IN REMISSION (HCC): ICD-10-CM

## 2024-06-07 PROCEDURE — 97110 THERAPEUTIC EXERCISES: CPT | Performed by: PHYSICAL THERAPIST

## 2024-06-07 PROCEDURE — 97112 NEUROMUSCULAR REEDUCATION: CPT | Performed by: PHYSICAL THERAPIST

## 2024-06-07 NOTE — PROGRESS NOTES
"Daily Note     Today's date: 2024  Patient name: Juve Livingston  : 1945  MRN: [id number]  Referring provider: Parmer, Lauren  Dx:   Encounter Diagnosis     ICD-10-CM    1. Other fracture of unspecified thoracic vertebra, subsequent encounter for fracture with routine healing  S22.008D       2. Multiple myeloma in remission (HCC)  C90.01                      Subjective: Pt reports feeling really well and he is completing HEP. He reports he is having an easier time getting up/down from the couch.       Objective: Treatment and documentation completed by Milagros Ramirez SPT, direct supervision and review of documentation completed by Mary Alice Espinosa MPT, Cert MDT See treatment diary below  Pt accidentally wore slippers to PT today, and was asked to wear sneakers going forward, which he usually does.    Assessment: Tolerated treatment well and has shown improvements in activity tolerance during treatment.  Pt continues to show improvements in LE strength during STS and FSU on foam. Pt remains limited with functional activities and flexibility. Pt tolerated progression of exercises very well today. Patient would benefit from continued PT to improve endurance, functional strength and flexibility.       Plan: Progress treatment as tolerated.       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      SOC: 4/3/2024  Re: 2024  POC EXP: 2024  DAILY TREATMENT LOG  date 2024  RE 2024   Visit #/auth 11 12 13 14 15   manual    5'    ROM/MMT    TT    Neuro Re-Ed 25' 20' 20' 20' 15'   FGA     TT    ABC scale    TT    Ambulat w/ HT Horiz HT; 15'x3 R/L CGA Horiz HT; 16'x6 CGA  Horiz HT 16'x6  Horiz HT x4 20'x4 CS   Retro walk 15'x3 CGA  15'x4  20'x2 CS   FT W/ EC        FT on foam w/ horiz HT        Low mat STS w/ feet on 2\" foam 2x10 CS 21\" mat w/ 2\" foam 2x10 CS w/ foam  2x10 CS w/ foam 21\" mat  2x10 CS w/ foam 21\" mat    Fwd step up non-recip to foam 4\" step +2\" foam 1x10 R/L " "CGA 4\" step +2\" foam 1x10 R/L CGA  4\" step + 2\" foam 1x10 R/L CGA  6\" step no rail CGA 1x10 R/L 6\" step + 2\"foam 1x10 R/L CGA    Ther Ex 15' 20' 20' 10' 20'   Recumb bike  L1x4'; 45-50 RPM L1x4': 45-50 RPM L1 x5' 45-50 RPM   L1 x5' 45-50 RPM    Sup ham stretch w/ SOS 20\"x3 R/L 20\"x3 R/L  20\"x3 R/L   20\"x3 R/L    Hip flexor stretch off edge 1'x1 R/L 1'x1 R/L 1'x1 R/L Prone b/L knee flex 1x15    LAQ  2# 3\" 2x10 R/L 2# 3\" 1x10 R/L; 1x10 on air pod R/L 2# 3\" 1x10 R/L;2x on air pod      Upright leg press B/L 55# 2x10 B/L 55# 2x10 B/L 55# 2x10 B/L 55# 2x12 B/L 55# 2x12   Rockerboard PD w/ rail for B/L gastroc stretch stand at rail 20x CS 20x CS 20x CS 20x w cS 20x w CS   Stand alt hip abd vs TB   RTB @ knees 1x10 R/L  Side step 3 steps to R/L x2   Stand alt hip exten   RTB @ knees 1x10 R/L  RTB @ knees    HEP     Update & review   Ther Activity 5' 5' 5' 10' 5'   bridges W TB abd grn 2x10 W/ TB grn abd 2x10 W/ TB grn abd 2x10 2x10 no TB 2x10 w/ grn TB   5xSTS; TUG    TT    Fwd step ups        Gait Training                        Modalities                        Access Code: [email or web address]  URL: [email or web address]  Date: 06/07/2024  Prepared by: Mary Alice Espinosa    Exercises  - Modified Kenroy Stretch  - 1 x daily - 2 reps - 1 min hold  - Hooklying Hamstring Stretch with Strap  - 1 x daily - 30 reps - 3 hold  - Standing Hip Extension with Resistance at Ankles and Unilateral Counter Support  - 1 x daily - 7 x weekly - 10 reps  - Heel Toe Raises with Counter Support  - 1 x daily - 7 x weekly - 1 sets - 10 reps  - Tandem Walking with Counter Support  - 1 x daily - 7 x weekly - 2 sets - 10 reps  - Supine Bridge with Resistance Band  - 1 x daily - 7 x weekly - 1 sets - 10 reps  - Side Stepping with Resistance at Ankles and Counter Support  - 1 x daily - 7 x weekly - 2 sets               "

## 2024-06-10 ENCOUNTER — OFFICE VISIT (OUTPATIENT)
Dept: PHYSICAL THERAPY | Facility: CLINIC | Age: 79
End: 2024-06-10
Payer: MEDICARE

## 2024-06-10 DIAGNOSIS — C90.01 MULTIPLE MYELOMA IN REMISSION (HCC): ICD-10-CM

## 2024-06-10 DIAGNOSIS — S22.008D OTHER FRACTURE OF UNSPECIFIED THORACIC VERTEBRA, SUBSEQUENT ENCOUNTER FOR FRACTURE WITH ROUTINE HEALING: Primary | ICD-10-CM

## 2024-06-10 PROCEDURE — 97110 THERAPEUTIC EXERCISES: CPT | Performed by: PHYSICAL THERAPIST

## 2024-06-10 PROCEDURE — 97112 NEUROMUSCULAR REEDUCATION: CPT | Performed by: PHYSICAL THERAPIST

## 2024-06-10 NOTE — PROGRESS NOTES
"Daily Note     Today's date: 6/10/2024  Patient name: Juve Livingston  : 1945  MRN: [id number]  Referring provider: Parmer, Lauren  Dx:   Encounter Diagnosis     ICD-10-CM    1. Other fracture of unspecified thoracic vertebra, subsequent encounter for fracture with routine healing  S22.008D       2. Multiple myeloma in remission (HCC)  C90.01                      Subjective: Pt reports feeling really well and was able to go out to dinner without his walker and felt good. Pt is seeing improvements since coming here and wants to continue beyond current POC expiration date.       Objective: See treatment diary below      Assessment: Tolerated treatment well and demonstrated improvements in LE strength during STS, step ups and hurdles. Pt tolerated progression to exercises well. Pt continues to demonstrate impaired activity tolerance throughout treatment but improving. Patient would benefit from continued PT to improve activity tolerance and LE strength.       Plan: Progress treatment as tolerated.       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      SOC: 4/3/2024  Re: 2024  POC EXP: 2024  DAILY TREATMENT LOG  date 6/10/2024  2024 2024  RE 2024   Visit #/auth 16  13 14 15   manual    5'    ROM/MMT    TT    Neuro Re-Ed 25'  20' 20' 15'   FGA     TT    ABC scale    TT    Ambulat w/ HT Horiz HT 20'x4 CS  Horiz HT 16'x6  Horiz HT x4 20'x4 CS   Retro walk 20'x4CS W/ HT?  15'x4  20'x2 CS   FT W/ EC        FT on foam w/ horiz HT        Low mat STS w/ feet on 2\" foam 2x10 CS w/ foam 21\" mat  2x10 CS w/ foam 21\" mat  2x10 CS w/ foam 21\" mat    Fwd step up non-recip to foam 6\" step + 2\"foam 1x10 R/L CGA   4\" step + 2\" foam 1x10 R/L CGA  6\" step no rail CGA 1x10 R/L 6\" step + 2\"foam 1x10 R/L CGA    Hurdles near rail 4 hurdles w/ CGA:   Fwd non-reciprocal 3 laps;   Lateral 2 laps R/L        Ther Ex 15'  20' 10' 20'   Recumb bike  L1x5' 45-50 RPM  L1 x5' 45-50 RPM   L1 x5' 45-50 RPM    Sup " "ham stretch w/ SOS 20\"x3 R/L   20\"x3 R/L   20\"x3 R/L    Hip flexor stretch off edge Prone b/L knee flex 1x15  1'x1 R/L Prone b/L knee flex 1x15    LAQ  2# 3\" 1x10 R/L;2x on air pod   2# 3\" 1x10 R/L;2x on air pod      Upright leg press B/L 55# 2x10  B/L 55# 2x10 B/L 55# 2x12 B/L 55# 2x12   Rockerboard PD w/ rail for B/L gastroc stretch stand at rail 20x CS  20x CS 20x w cS 20x w CS   Stand alt hip abd vs TB   RTB @ knees 1x10 R/L  Side step 3 steps to R/L x2   Stand alt hip exten   RTB @ knees 1x10 R/L  RTB @ knees    HEP     Update & review   Ther Activity 5'  5' 10' 5'   bridges 2x10 w/ grn TB   W/ TB grn abd 2x10 2x10 no TB 2x10 w/ grn TB   5xSTS; TUG    TT    Fwd step ups        Gait Training                        Modalities                        Access Code: [email or web address]  URL: [email or web address]  Date: 06/07/2024  Prepared by: Mary Alice Espinosa    Exercises  - Modified Kenroy Stretch  - 1 x daily - 2 reps - 1 min hold  - Hooklying Hamstring Stretch with Strap  - 1 x daily - 30 reps - 3 hold  - Standing Hip Extension with Resistance at Ankles and Unilateral Counter Support  - 1 x daily - 7 x weekly - 10 reps  - Heel Toe Raises with Counter Support  - 1 x daily - 7 x weekly - 1 sets - 10 reps  - Tandem Walking with Counter Support  - 1 x daily - 7 x weekly - 2 sets - 10 reps  - Supine Bridge with Resistance Band  - 1 x daily - 7 x weekly - 1 sets - 10 reps  - Side Stepping with Resistance at Ankles and Counter Support  - 1 x daily - 7 x weekly - 2 sets                 "

## 2024-06-12 ENCOUNTER — OFFICE VISIT (OUTPATIENT)
Dept: PHYSICAL THERAPY | Facility: CLINIC | Age: 79
End: 2024-06-12
Payer: MEDICARE

## 2024-06-12 DIAGNOSIS — C90.01 MULTIPLE MYELOMA IN REMISSION (HCC): ICD-10-CM

## 2024-06-12 DIAGNOSIS — S22.008D OTHER FRACTURE OF UNSPECIFIED THORACIC VERTEBRA, SUBSEQUENT ENCOUNTER FOR FRACTURE WITH ROUTINE HEALING: Primary | ICD-10-CM

## 2024-06-12 PROCEDURE — 97110 THERAPEUTIC EXERCISES: CPT | Performed by: SPECIALIST

## 2024-06-12 NOTE — PROGRESS NOTES
"Daily Note     Today's date: 2024  Patient name: Juve Livingston  : 1945  MRN: [id number]  Referring provider: Parmer, Lauren  Dx:   Encounter Diagnosis     ICD-10-CM    1. Other fracture of unspecified thoracic vertebra, subsequent encounter for fracture with routine healing  S22.008D       2. Multiple myeloma in remission (HCC)  C90.01                      Subjective: Pt reports feeling slightly tired today but didn't do much this morning except water the garden. He reports doing HEP which is going well and will try to do it on vacation next week.       Objective: Treatment and documentation completed by Milagros Ramirez SPT, direct supervision and review of documentation completed by Emigdio Manriquez, PT.    See treatment diary below      Assessment: Tolerated treatment well and demonstrated good technique with therapeutic exercises. Pt tolerated progression of retro walking during treatment today. Pt demonstrated fatigue during STS today therefore limited the number of repetitions. Patient would benefit from continued PT to improve endurance, LE strength and balance.       Plan: Progress treatment as tolerated.       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      SOC: 4/3/2024  Re: 2024  POC EXP: 2024  DAILY TREATMENT LOG  date 6/10/2024 2024  2024  RE 2024   Visit #/auth 16   14 15   manual    5'    ROM/MMT    TT    Neuro Re-Ed 25' 20'  20' 15'   FGA     TT    ABC scale    TT    Ambulat w/ HT Horiz HT 20'x4 CS Horiz HT 20'x4 CS   Horiz HT x4 20'x4 CS   Retro walk 20'x4CS 20'x2 CS W/ HT   20'x2 CS   FT W/ EC        FT on foam w/ horiz HT        Low mat STS w/ feet on 2\" foam 2x10 CS w/ foam 21\" mat 1x10 CS w/ foam 21\" mat; 1x5 due to fatigue   2x10 CS w/ foam 21\" mat    Fwd step up non-recip to foam 6\" step + 2\"foam 1x10 R/L CGA    6\" step no rail CGA 1x10 R/L 6\" step + 2\"foam 1x10 R/L CGA    Hurdles near rail 4 hurdles w/ CGA:   Fwd non-reciprocal 3 laps;   Lateral 2 " "laps R/L  4 hurdles w/ CGA:   Fwd non-reciprocal 3 laps;   Lateral 2 laps R/L       Ther Ex 15' 20'  10' 20'   Recumb bike  L1x5' 45-50 RPM L1x5' 45-50 RPM    L1 x5' 45-50 RPM    Sup ham stretch w/ SOS 20\"x3 R/L  20\"x3 R/L    20\"x3 R/L    Hip flexor stretch off edge Prone b/L knee flex 1x15 Prone B/L knee flex 1x15  Prone b/L knee flex 1x15    LAQ  2# 3\" 1x10 R/L;2x on air pod  2# on airpod 3' 1x10 R/L;2x      Upright leg press B/L 55# 2x10 B/L 55# 2x10  B/L 55# 2x12 B/L 55# 2x12   Rockerboard PD w/ rail for B/L gastroc stretch stand at rail 20x CS 20x CS  20x w cS 20x w CS   Stand alt hip abd vs TB  RTB @ counter side steping x2 R/L    Side step 3 steps to R/L x2   Stand alt hip exten  RTB 2x10   RTB @ knees    HEP  Reviewed to complete on vacation   Update & review   Ther Activity 5' 5'  10' 5'   bridges 2x10 w/ grn TB  2x10 w/ grn TB   2x10 no TB 2x10 w/ grn TB   5xSTS; TUG    TT    Fwd step ups        Gait Training                        Modalities                        Access Code: [email or web address]  URL: [email or web address]  Date: 06/07/2024  Prepared by: Mary Alice Espinosa    Exercises  - Modified Kenroy Stretch  - 1 x daily - 2 reps - 1 min hold  - Hooklying Hamstring Stretch with Strap  - 1 x daily - 30 reps - 3 hold  - Standing Hip Extension with Resistance at Ankles and Unilateral Counter Support  - 1 x daily - 7 x weekly - 10 reps  - Heel Toe Raises with Counter Support  - 1 x daily - 7 x weekly - 1 sets - 10 reps  - Tandem Walking with Counter Support  - 1 x daily - 7 x weekly - 2 sets - 10 reps  - Supine Bridge with Resistance Band  - 1 x daily - 7 x weekly - 1 sets - 10 reps  - Side Stepping with Resistance at Ankles and Counter Support  - 1 x daily - 7 x weekly - 2 sets                   "

## 2024-06-24 ENCOUNTER — OFFICE VISIT (OUTPATIENT)
Dept: PHYSICAL THERAPY | Facility: CLINIC | Age: 79
End: 2024-06-24
Payer: MEDICARE

## 2024-06-24 DIAGNOSIS — S22.008D OTHER FRACTURE OF UNSPECIFIED THORACIC VERTEBRA, SUBSEQUENT ENCOUNTER FOR FRACTURE WITH ROUTINE HEALING: Primary | ICD-10-CM

## 2024-06-24 DIAGNOSIS — C90.01 MULTIPLE MYELOMA IN REMISSION (HCC): ICD-10-CM

## 2024-06-24 PROCEDURE — 97112 NEUROMUSCULAR REEDUCATION: CPT | Performed by: PHYSICAL THERAPIST

## 2024-06-24 PROCEDURE — 97110 THERAPEUTIC EXERCISES: CPT | Performed by: PHYSICAL THERAPIST

## 2024-06-24 NOTE — PROGRESS NOTES
"Daily Note     Today's date: 2024  Patient name: Juve Livingston  : 1945  MRN: [id number]  Referring provider: Christopher Bull MD  Dx:   Encounter Diagnosis     ICD-10-CM    1. Other fracture of unspecified thoracic vertebra, subsequent encounter for fracture with routine healing  S22.008D       2. Multiple myeloma in remission (HCC)  C90.01                      Subjective: Pt states he did not do his HEP while on vacation. He also fell getting off the plane on the jet way. He has some mild low back soreness but is otherwise OK.      Objective: See treatment diary below; no bruising or TTP in pt's low back.      Assessment: Tolerated treatment fair and we reduced exercise intensity slightly as a precaution since pt has not kept up w/ HEP. He did fatigue a bit quicker than prior to his vacation. Pt was not limited by pain . Patient would benefit from continued PT and resuming his HEP which was discussed.      Plan: Continue per plan of care.      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      SOC: 4/3/2024  Re: 2024  POC EXP: 2024  DAILY TREATMENT LOG  date 6/10/2024 2024 2024  2024   Visit #/auth 16 17 18  15   manual        ROM/MMT        Neuro Re-Ed 25' 20' 15'  15'   FGA         ABC scale        Sidestep no rail   10'x2 ea R/L supervision     Ambulat w/ HT Horiz HT 20'x4 CS Horiz HT 20'x4 CS   Horiz HT x4 20'x4 CS   Retro walk 20'x4CS 20'x2 CS W/ HT   20'x2 CS   FT W/ EC   On foam 30\"x1 CGA     FT on foam w/ horiz HT   1x10 CGA     Low mat STS w/ feet on 2\" foam 2x10 CS w/ foam 21\" mat 1x10 CS w/ foam 21\" mat; 1x5 due to fatigue   2x10 CS w/ foam 21\" mat    Fwd step up non-recip to foam 6\" step + 2\"foam 1x10 R/L CGA   Step up to 2\" foam 1x5 R/L CGA  6\" step + 2\"foam 1x10 R/L CGA    Hurdles near rail 4 hurdles w/ CGA:   Fwd non-reciprocal 3 laps;   Lateral 2 laps R/L  4 hurdles w/ CGA:   Fwd non-reciprocal 3 laps;   Lateral 2 laps R/L  High march fwd walk (no hurdles) " "10'x4 CGA     Ther Ex 15' 20' 25'  20'   Recumb bike  L1x5' 45-50 RPM L1x5' 45-50 RPM  L2x5' 45-50 RPM  L1 x5' 45-50 RPM    Sup ham stretch w/ SOS 20\"x3 R/L  20\"x3 R/L  20\"x3 R/L  20\"x3 R/L    Hip flexor stretch off edge Prone b/L knee flex 1x15 Prone B/L knee flex 1x15 Off edge 1'x1 R/L     LAQ  2# 3\" 1x10 R/L;2x on air pod  2# on airpod 3\" 1x10 R/L;2x 2#; 3\" 1x10 R/L no airpod     Upright leg press B/L 55# 2x10 B/L 55# 2x10 B/L 50# 2x10  B/L 55# 2x12   Rockerboard PD w/ rail for B/L gastroc stretch stand at rail 20x CS 20x CS 20x CS  20x w CS   Stand alt hip abd vs TB  RTB @ counter side steping x2 R/L  YTB mid shin 1x10 R/L  Side step 3 steps to R/L x2   Stand alt hip exten  RTB 2x10 YTB mid shin 1x10 R/L  RTB @ knees    HEP  Reviewed to complete on vacation   Update & review   Ther Activity 5' 5' 5'  5'   bridges 2x10 w/ grn TB  2x10 w/ grn TB  2x10 no TB  2x10 w/ grn TB   5xSTS; TUG        Fwd step ups        Gait Training                        Modalities                        Access Code: [email or web address]  URL: [email or web address]  Date: 06/07/2024  Prepared by: Mary Alice Espinosa    Exercises  - Modified Kenroy Stretch  - 1 x daily - 2 reps - 1 min hold  - Hooklying Hamstring Stretch with Strap  - 1 x daily - 30 reps - 3 hold  - Standing Hip Extension with Resistance at Ankles and Unilateral Counter Support  - 1 x daily - 7 x weekly - 10 reps  - Heel Toe Raises with Counter Support  - 1 x daily - 7 x weekly - 1 sets - 10 reps  - Tandem Walking with Counter Support  - 1 x daily - 7 x weekly - 2 sets - 10 reps  - Supine Bridge with Resistance Band  - 1 x daily - 7 x weekly - 1 sets - 10 reps  - Side Stepping with Resistance at Ankles and Counter Support  - 1 x daily - 7 x weekly - 2 sets                     "

## 2024-06-27 ENCOUNTER — OFFICE VISIT (OUTPATIENT)
Dept: PHYSICAL THERAPY | Facility: CLINIC | Age: 79
End: 2024-06-27
Payer: MEDICARE

## 2024-06-27 DIAGNOSIS — S22.008D OTHER FRACTURE OF UNSPECIFIED THORACIC VERTEBRA, SUBSEQUENT ENCOUNTER FOR FRACTURE WITH ROUTINE HEALING: Primary | ICD-10-CM

## 2024-06-27 DIAGNOSIS — C90.01 MULTIPLE MYELOMA IN REMISSION (HCC): ICD-10-CM

## 2024-06-27 PROCEDURE — 97110 THERAPEUTIC EXERCISES: CPT | Performed by: PHYSICAL THERAPIST

## 2024-06-27 PROCEDURE — 97112 NEUROMUSCULAR REEDUCATION: CPT | Performed by: PHYSICAL THERAPIST

## 2024-06-27 NOTE — PROGRESS NOTES
"Daily Note     Today's date: 2024  Patient name: Juve Livnigston  : 1945  MRN: [id number]  Referring provider: Christopher Bull MD  Dx:   Encounter Diagnosis     ICD-10-CM    1. Other fracture of unspecified thoracic vertebra, subsequent encounter for fracture with routine healing  S22.008D       2. Multiple myeloma in remission (HCC)  C90.01                      Subjective: Pt gave an update on this fall during vacation and feels that his back pain is improving. Pt reports not doing HEP on vacation, and completed it the first time last night.        Objective: Treatment and documentation completed by Milagros Ramirez SPT, direct supervision and review of documentation completed by Mary Alice Espinosa MPT, Cert MDT   See treatment diary below      Assessment: Tolerated treatment fair. Pt was able to tolerate additional exercises that were modified last session. Pt fatigued earlier in treatment session than session prior to vacation. Pt was educated on the importance of starting his home program again to re-gain endurance, LE strength and progress that had been made thus far. Patient would benefit from continued PT to improve balance, LE strength and endurance.       Plan: Progress treatment as tolerated.       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      SOC: 4/3/2024  Re: 2024  POC EXP: 2024  DAILY TREATMENT LOG  date 6/10/2024 2024 2024 2024    Visit #/auth 16 17 18 19    manual        ROM/MMT        Neuro Re-Ed 25' 20' 15' 20'    FGA         ABC scale        Sidestep no rail   10'x2 ea R/L supervision     Ambulat w/ HT Horiz HT 20'x4 CS Horiz HT 20'x4 CS  20'x1 horiz HT CS    Retro walk 20'x4CS 20'x2 CS W/ HT  20'x1 CS     FT W/ EC   On foam 30\"x1 CGA On foam 20\"x3    FT on foam w/ horiz HT   1x10 CGA 1x10 CGA    Low mat STS w/ feet on 2\" foam 2x10 CS w/ foam 21\" mat 1x10 CS w/ foam 21\" mat; 1x5 due to fatigue  1x5; 2x seated foam on chiar w/ foam under feet     Fwd step up " "non-recip to foam 6\" step + 2\"foam 1x10 R/L CGA   Step up to 2\" foam 1x5 R/L CGA Step up 4\" +2\" foam pad CGA 1x    Hurdles near rail 4 hurdles w/ CGA:   Fwd non-reciprocal 3 laps;   Lateral 2 laps R/L  4 hurdles w/ CGA:   Fwd non-reciprocal 3 laps;   Lateral 2 laps R/L  High march fwd walk (no hurdles) 10'x4 CGA High march fwd walk (no hurdles) 10'x4 CGA    Ther Ex 15' 20' 25' 20'    Recumb bike  L1x5' 45-50 RPM L1x5' 45-50 RPM  L2x5' 45-50 RPM L2x5' 45-50 RPM    Sup ham stretch w/ SOS 20\"x3 R/L  20\"x3 R/L  20\"x3 R/L 20\"x3 R/L    Hip flexor stretch off edge Prone b/L knee flex 1x15 Prone B/L knee flex 1x15 Off edge 1'x1 R/L Off edge 1'x1 R/L    LAQ  2# 3\" 1x10 R/L;2x on air pod  2# on airpod 3\" 1x10 R/L;2x 2#; 3\" 1x10 R/L no airpod 2#; 3\" 1x10 R/L no airpod    Upright leg press B/L 55# 2x10 B/L 55# 2x10 B/L 50# 2x10 B/L 50# 2x10    Rockerboard PD w/ rail for B/L gastroc stretch stand at rail 20x CS 20x CS 20x CS 20x CS    Stand alt hip abd vs TB  RTB @ counter side steping x2 R/L  YTB mid shin 1x10 R/L YTB mid shin 1x10 R/L    Stand alt hip exten  RTB 2x10 YTB mid shin 1x10 R/L YTB mid shin 1x10 R/L    HEP  Reviewed to complete on vacation      Ther Activity 5' 5' 5' 5'    bridges 2x10 w/ grn TB  2x10 w/ grn TB  2x10 no TB 2x10 no TB    5xSTS; TUG        Fwd step ups        Gait Training                        Modalities                        Access Code: [email or web address]  URL: [email or web address]  Date: 06/07/2024  Prepared by: Mary Alice Espinosa    Exercises  - Modified Kenroy Stretch  - 1 x daily - 2 reps - 1 min hold  - Hooklying Hamstring Stretch with Strap  - 1 x daily - 30 reps - 3 hold  - Standing Hip Extension with Resistance at Ankles and Unilateral Counter Support  - 1 x daily - 7 x weekly - 10 reps  - Heel Toe Raises with Counter Support  - 1 x daily - 7 x weekly - 1 sets - 10 reps  - Tandem Walking with Counter Support  - 1 x daily - 7 x weekly - 2 sets - 10 reps  - Supine Bridge with Resistance Band  " - 1 x daily - 7 x weekly - 1 sets - 10 reps  - Side Stepping with Resistance at Ankles and Counter Support  - 1 x daily - 7 x weekly - 2 sets

## 2024-06-28 ENCOUNTER — APPOINTMENT (OUTPATIENT)
Dept: PHYSICAL THERAPY | Facility: CLINIC | Age: 79
End: 2024-06-28
Payer: MEDICARE

## 2024-07-02 ENCOUNTER — OFFICE VISIT (OUTPATIENT)
Dept: PHYSICAL THERAPY | Facility: CLINIC | Age: 79
End: 2024-07-02
Payer: MEDICARE

## 2024-07-02 DIAGNOSIS — C90.01 MULTIPLE MYELOMA IN REMISSION (HCC): ICD-10-CM

## 2024-07-02 DIAGNOSIS — S22.008D OTHER FRACTURE OF UNSPECIFIED THORACIC VERTEBRA, SUBSEQUENT ENCOUNTER FOR FRACTURE WITH ROUTINE HEALING: Primary | ICD-10-CM

## 2024-07-02 PROCEDURE — 97112 NEUROMUSCULAR REEDUCATION: CPT

## 2024-07-02 PROCEDURE — 97110 THERAPEUTIC EXERCISES: CPT

## 2024-07-02 NOTE — PROGRESS NOTES
"Daily Note     Today's date: 2024  Patient name: Juve Livingston  : 1945  MRN: [id number]  Referring provider: Christopher Bull MD  Dx:   Encounter Diagnosis     ICD-10-CM    1. Other fracture of unspecified thoracic vertebra, subsequent encounter for fracture with routine healing  S22.008D       2. Multiple myeloma in remission (HCC)  C90.01                      Subjective: Pt reports that he walked a lot yesterday and felt good but slightly tired when he got home. Pt reports when walking prolonged he gets a \"sore spot\" in his back but stops when stopped walking. Pt reports not doing HEP.       Objective: Treatment and documentation completed by Milagros OLIVARES, direct supervision and review of documentation completed by Aspen Jade PTA. See treatment diary below      Assessment: Tolerated treatment well. Discussed w/ pt the possibility of going into maintenance phase after re-eval next visit. Educated on the importance of independence with HEP if we were to go to maintenance phase to maintain what has been achieved in PT. Pt demonstrated improvement in LE strength with progression to exercises today. Pt demonstrated good activity tolerance today compared to previous sessions. Patient would benefit from continued PT to improve LE strength and endurance.       Plan: Continue per plan of care.      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      SOC: 4/3/2024  Re: 2024  POC EXP: 2024  DAILY TREATMENT LOG  date 6/10/2024 2024 2024 2024 2024   Visit #/auth 16 17 18 19 20   manual        ROM/MMT        Neuro Re-Ed 25' 20' 15' 20' 20'   FGA         ABC scale        Sidestep no rail   10'x2 ea R/L supervision     Ambulat w/ HT Horiz HT 20'x4 CS Horiz HT 20'x4 CS  20'x1 horiz HT CS 12'x2 horiz HT CS    Retro walk 20'x4CS 20'x2 CS W/ HT  20'x1 CS  12'x2 CS w/ HT    FT W/ EC   On foam 30\"x1 CGA On foam 20\"x3 On foam 20\"x3   FT on foam w/ horiz HT   1x10 CGA 1x10 CGA 1x10 " "R/L CGA   Low mat STS w/ feet on 2\" foam 2x10 CS w/ foam 21\" mat 1x10 CS w/ foam 21\" mat; 1x5 due to fatigue  1x5; 2x seated foam on chiar w/ foam under feet  1x10 CS; 21\" mat feet on foam    Fwd step up non-recip to foam 6\" step + 2\"foam 1x10 R/L CGA   Step up to 2\" foam 1x5 R/L CGA Step up 4\" +2\" foam pad CGA 1x Step up 4\" +2\" foam pad CGA 1x10 R/L    Hurdles near rail 4 hurdles w/ CGA:   Fwd non-reciprocal 3 laps;   Lateral 2 laps R/L  4 hurdles w/ CGA:   Fwd non-reciprocal 3 laps;   Lateral 2 laps R/L  High march fwd walk (no hurdles) 10'x4 CGA High march fwd walk (no hurdles) 10'x4 CGA 4 hurdles w/ CGA:   Fwd non-reciprocal 3 laps;   Lateral 2 laps R/L    Ther Ex 15' 20' 25' 20' 20'   Recumb bike  L1x5' 45-50 RPM L1x5' 45-50 RPM  L2x5' 45-50 RPM L2x5' 45-50 RPM L2x5' 45-50 RPM   Sup ham stretch w/ SOS 20\"x3 R/L  20\"x3 R/L  20\"x3 R/L 20\"x3 R/L 20\"x3 R/L    Hip flexor stretch off edge Prone b/L knee flex 1x15 Prone B/L knee flex 1x15 Off edge 1'x1 R/L Off edge 1'x1 R/L Off edge 1'x1 R/L    LAQ  2# 3\" 1x10 R/L;2x on air pod  2# on airpod 3\" 1x10 R/L;2x 2#; 3\" 1x10 R/L no airpod 2#; 3\" 1x10 R/L no airpod 2.5# 3\" 2x10    Upright leg press B/L 55# 2x10 B/L 55# 2x10 B/L 50# 2x10 B/L 50# 2x10 B/L 50# 2x10   Rockerboard PD w/ rail for B/L gastroc stretch stand at rail 20x CS 20x CS 20x CS 20x CS 20x CS   Stand alt hip abd vs TB  RTB @ counter side steping x2 R/L  YTB mid shin 1x10 R/L YTB mid shin 1x10 R/L YTB mid shin 1x10 R/L   Stand alt hip exten  RTB 2x10 YTB mid shin 1x10 R/L YTB mid shin 1x10 R/L YTB mid shin 1x10 R/L   HEP  Reviewed to complete on vacation      Ther Activity 5' 5' 5' 5' 5'   bridges 2x10 w/ grn TB  2x10 w/ grn TB  2x10 no TB 2x10 no TB 2x10    5xSTS; TUG        Fwd step ups        Gait Training                        Modalities                        Access Code: [email or web address]  URL: [email or web address]  Date: 06/07/2024  Prepared by: Mary Alice Espinosa    Exercises  - Modified Kenroy Stretch  " - 1 x daily - 2 reps - 1 min hold  - Hooklying Hamstring Stretch with Strap  - 1 x daily - 30 reps - 3 hold  - Standing Hip Extension with Resistance at Ankles and Unilateral Counter Support  - 1 x daily - 7 x weekly - 10 reps  - Heel Toe Raises with Counter Support  - 1 x daily - 7 x weekly - 1 sets - 10 reps  - Tandem Walking with Counter Support  - 1 x daily - 7 x weekly - 2 sets - 10 reps  - Supine Bridge with Resistance Band  - 1 x daily - 7 x weekly - 1 sets - 10 reps  - Side Stepping with Resistance at Ankles and Counter Support  - 1 x daily - 7 x weekly - 2 sets

## 2024-07-03 ENCOUNTER — TELEPHONE (OUTPATIENT)
Age: 79
End: 2024-07-03

## 2024-07-03 NOTE — TELEPHONE ENCOUNTER
Patient scheduled for consult 10/9/24 - Cancer patient currently on remission. Please review schedule for a possible earlier appt, and contact patient.

## 2024-07-08 ENCOUNTER — EVALUATION (OUTPATIENT)
Dept: PHYSICAL THERAPY | Facility: CLINIC | Age: 79
End: 2024-07-08
Payer: MEDICARE

## 2024-07-08 DIAGNOSIS — C90.01 MULTIPLE MYELOMA IN REMISSION (HCC): ICD-10-CM

## 2024-07-08 DIAGNOSIS — S22.008D OTHER FRACTURE OF UNSPECIFIED THORACIC VERTEBRA, SUBSEQUENT ENCOUNTER FOR FRACTURE WITH ROUTINE HEALING: Primary | ICD-10-CM

## 2024-07-08 PROCEDURE — 97112 NEUROMUSCULAR REEDUCATION: CPT | Performed by: PHYSICAL THERAPIST

## 2024-07-08 PROCEDURE — 97530 THERAPEUTIC ACTIVITIES: CPT | Performed by: PHYSICAL THERAPIST

## 2024-07-08 PROCEDURE — 97110 THERAPEUTIC EXERCISES: CPT | Performed by: PHYSICAL THERAPIST

## 2024-07-08 NOTE — PROGRESS NOTES
PT Re-Evaluation   (Resolve rehabilitative care; start Maintenace)    Today's date: 2024  Patient name: Juve Livingston  : 1945  MRN: [id number]  Referring provider: Christopher Bull MD  Dx:   Encounter Diagnosis     ICD-10-CM    1. Other fracture of unspecified thoracic vertebra, subsequent encounter for fracture with routine healing  S22.008D       2. Multiple myeloma in remission (HCC)  C90.01                        Assessment  Impairments: abnormal or restricted ROM and impaired balance  Other impairment: poor tolerance to prolonged static positions    Assessment details: 2024: Pt has attended 21 skilled PT visits after today and has shown improvements in LE strength, endurance, LE flexibility and ROM. Pt has demonstrated improvement in 5xSTS, SLS time and FGA score. Pt has plateaued w/ progress for TUG time and ABC scale indicating good candidate for maintenance pahse. Pt has had difficulty in the past with continuous use of HEP and has seen regressions with this. Plan to move into maintenance phase with no expected progression in LE strength or ROM but increased independence with HEP. PT would benefit from 1 x week skilled PT to maintain flexibility, LE strength and activity tolerance since starting PT.     2024: Pt returns today after 3 week break in care due to patient kimberli pneumonia. RE performed today due to prolonged illness to determine if any change in status. Pt demonstrates some decrease in MMT, balance testing fairly consistent w/ IE, flexibility remains unchanged. Pt presentation fairly consistent w/ SOC 4/3/2024. Plan of care will remain unchanged. Pt endurance does seem a bit regressed, but pt remains a good candidate for skilled PT. He was encouraged to resume his HEP as he feels able. Pt continues w/ Rollator walker for outings and intermittent use indoors.    4/3/2024: Juve Livingston is a 78 y.o. male who presents 2 years post thoracic fusion with lumbar with pain,  decreased strength, decreased ROM, decreased joint mobility, ambulatory dysfunction, postural dysfunction, balance dysfunction, and decreased endurance. Due to these impairments, patient has difficulty performing ADL's, recreational activities, engaging in social activities, ambulation, stair negotiation, lifting/carrying, transfers, reaching. Patient's clinical presentation is consistent with their referring diagnosis of thoracic compression fx (s/p fusion) and general deconditioning.   Patient has been educated in home exercise program and plan of care. Patient would benefit from skilled physical therapy services to address their aforementioned functional limitations and progress towards prior level of function and independence with home exercise program and self symptom management/resolution.     Understanding of Dx/Px/POC: good     Prognosis: good    Goals  Short Term Goals:  Target Date 5/1/2024  1. Initiate and advance HEP toward self symptom reduction/resolution.- met  2. Improve hip flexor flexibility to allow pt to stand fully upright and to report improved static standing tolerance to >/=10 min w/ 0-2/10 LBP. - met  3. Improve hamstring flexibility to 65 deg or better B/L to allow lumbar flexion to min robertson or better.- met  4. Improve LE strength such that pt can complete 5xSTS w/o need for UE assist. - met  5. Pt to report at least 50% reduction in pain upon raising from prolonged sitting. - met    Long Term Goals:  Target Date 7/26/2024 -  1. Indep with HEP and self symptom prevention. -met   2. Improve ABC scale (w/ RW) to >/=75% confidence. -not met  3. Improve LE strength to >/= 4/5 to allow ease w/ bridges, bed mobility and achieve 5xSTS w/o UE assist in 22 sec or less. -met  4. Improve balance such that pt can maintain SLS w/ EO x 5 sec or more R/L and achieve FGA score of 24/30 or better. - improved SLS; FGA score not met   5. Pt to be able to ambulate community distances w/ SPC or no AD. - able to;  "use AD due to symptoms from medications    New Maintenance Goals Created 7/8/2024: Target Date 8/19/2024  1. Indep with HEP at least 4-5 times a week.   2. Maintain ability to ambulate community distances w/ SPC or no AD.   3. Maintain hip flexor flexibility to allow pt to stand with upright posture.    4. Maintain ABC scale (w/ RW) to >=70% confidence.    5. Maintain LE strength to be able to complete 5xSTS w/o need for UE support.   6. Maintain FGA score of 20/30 or better.     Plan  Patient would benefit from: skilled physical therapy  Planned modality interventions: thermotherapy: hydrocollator packs and unattended electrical stimulation    Planned therapy interventions: joint mobilization, patient education, postural training, abdominal trunk stabilization, functional ROM exercises, home exercise program, neuromuscular re-education, strengthening, stretching, therapeutic activities and therapeutic exercise  Other planned therapy interventions: mechanical assessment    Frequency: 1x week  Plan of Care beginning date: 7/8/2024  Plan of Care expiration date: 8/19/2024  Treatment plan discussed with: patient      Subjective Evaluation    History of Present Illness  Mechanism of injury: Subjective   7/8/2024: Pt reports he feels good today. Pt reports that he is ready to move into maintenance phase and ready to be more indep with HEP. Pt reports he would still like to come once a week to avoid losing progress.     6/4/2024:   4/29/2024: Pt returns to PT after 3 week break in care due to pneumonia (5th time he's had it). He reports he's been working on walking in his home, but has not kept up w/ his HEP during the past 3 weeks. He denies any increase in pain or new pain. He reports mild decrease in his endurance, but thinks he's \"doing pretty good considering I had pneumonia\". He reports no change in his back pain.    4/3/2024: Pt reports to PT for general deconditioning, imbalance, LE weakness and also reports some " back pain. He had a compression fx at T3 and subsequently had thoracic fusion surgery T2-T6 in 2021. He was sent to rehab, but returned to the hospital due to pneumonia. He was soon after dx w/ melanoma. He underwent treatment for this and is currently in remission. He never completed rehab after his back surgery, citing multiple bouts of pneumonia, Covid and cancer treatments.   He c/o low back pain now, occurs w/ rising from prolonged sitting or standing; better as he walks. No UE or LE symptoms.   He c/o leg crams at night.   Pt has been using rollator walker for ambulation outside of his home since his back surgery. In his home, he ambulates w/o AD and denies holding onto furniture.  He arrives to PT w/ Rx from his surgeon Dr Ovalle for Thoracic fx rehab and from Oncologist Dr Wright for general conditioning. He states he will follow up w/ oncologist, but may not follow up w/ ortho surgeon.   Patient Goals  Patient goals for therapy: increased strength, decreased pain, independence with ADLs/IADLs and return to sport/leisure activities  Patient goal: less back pain; stronger legs  Pain  At best pain ratin  At worst pain ratin  Progression: resolved    Treatments  Current treatment: physical therapy        Objective  Treatment and documentation completed by Milagros Ramirez SPT, direct supervision and review of documentation completed by Mary Alice Espinosa MPT, Cert MDT.     MYOTOMAL TESTIN/8/2024 2024 2024 2024 2024 2024 4/3/2024 4/3/2024     Right  Left  Right  Left  Right  Left  Right  Left  L2-3 Hip flexion:  /5  5/5  5/5  5/5  4+/5  4+/5  5/5  4+/5  L3-4 Knee extension: /  5/5   5/5  5/5  4+/5  4+/5  5/5  4+/5  L5 Great toe exten:  5/5  5/5  5/5  5/5  5/5  5/5  5/5  5/5  L4 Heel walk/DF:  /  5/5  5/5  5/5  5/5  5/5  5/5  5/5  S1 toe walk/PF/ever:  4+/5  4+/5  4/5  4/5  4/5  4/5  4/5  4/5  S2 knee flex:   4+/5  4+/5  4+/5  4+/5  4/5  4/5  4/5  4/5  Hip  ER   5/5  5/5  5/5  5/5  4+/5  4+/5  4+/5  4+/5  Hip IR   5/5  5/5  5/5  5/5  5/5  5/5  4+/5  4+/5  Hip abd  5/5  5/5  5/5  5/5  4-/5  4-/5  4/5  4/5  Hip exten  4+/5  4+/5  4+/5  4+/5  4-/5  4-/5  3-/5  3+/5    LUMBAR AROM: 7/8/2024 6/4/2024 4/29/2024 4/3/2024  Flexion   Mod   Mod  Mod  Mod robertson (FT mid shin)  Extension  Onel   Onel  Onel  onel 90% robertson  R sideglide  Mod   Mod  Mod  Mod 60% robertson  L sideglide  Mod   Mod  Onel  Onel 80% robertson      FUNCTION:  7/8/2024: Pt uses RW for outings for stability since thor fusion 2 years ago - reports due to occasional lightheadedness from his usual meds   No difficulty w/ bridges/bed mobility   No remaining LBP w/ getting up from prolonged sitting and w/ static standing > 10 min   Quad lag B/L (knee ext qset/SLR): not tested today   Demonstrates flexed posture in standing (hip flexor tightness) -improved   Limited forward bending due to hamstring tightness  6/4/2024: Pt uses RW for outings for stability since thor fusion 2 years ago - reports due to occasional lightheadedness from his usual meds   no difficulty w/ bridges/bed mobility   No remaining LBP w/ getting up from prolonged sitting and w/ static standing > 10 min   Quad lag B/L (knee ext qset/SLR): not tested today   Demonstrates flexed posture in standing (hip flexor tightness)   Limited forward bending due to hamstring tightness  4/29/2024: Pt is limited with ambulation w/o AD - uses RW for outings for stability since thor fusion 2 years ago   Mod difficulty w/ bridges/bed mobility   LBP w/ getting up from prolonged sitting and w/ static standing > 10 min   Quad lag B/L (knee ext qset/SLR): R +2/-4; L 0/-4   Demonstrates flexed posture in standing (hip flexor tightness)   Limited forward bending due to hamstring tightness  4/3/2024: Pt is limited with ambulation w/o AD - uses RW for outings for stability since thor fusion 2 years ago   Mod difficulty w/ bridges/bed mobility   LBP w/ getting up from prolonged sitting and w/  static standing > 5 min   Quad lag B/L (knee ext qset/SLR): R +2/-4; L 0/-4   Demonstrates flexed posture in standing (hip flexor tightness)   Limited forward bending due to hamstring tightness      FLEXIBILITY:  7/8/2024:hamstring flexibility 85 deg B/L   Quads  B/L in prone  6/4/2024: hamstring flexibility 60 deg B/L   Quads WNL R 115; L 120 in prone  4/29/2024: hamsrting flexibility mod/tera robertson B/L 60 deg   Quads min robertson B/L prone knee flex R110/ L 120; hip flexors mod robertson B/L   4/3/2024: Hamstring flexibility tera robertson B/L 55 deg         Quad/hip flexor flexibitlity mod robertson R/min robertson L -  knee flex sup/prone R- 105/113; L 120/124        Pirif flexibility mod/tera robertson B/L       Outcome Measures Initial Eval  4/3/2024 RE  4/29/2024 RE   6/4/2024 RE   7/8/2024     5xSTS 35 sec minor use of hands 29.5 SEC min use hands; 7/10 fatigue 18 sec no use UE's 17.5 sec no use of UE's     TUG 14.67 sec no AD 16 sec no AD 11.5 sec no AD 14.8 sec no AD      10 meter NT sec =  NT m/s        CAMPOS NT/56        FGA 13/30 4/5/2024 13/30 18/30 20/30     mCTSIB  - FTEO (firm)  - FTEC (firm)  - FTEO (foam)  - FTEC (foam)   60+ sec  8 sec  60+ sec  3 sec   60+ sec  60+ sec  60+ sec  5 sec   60+sec  60+ sec  60+ sec  60+ sec   60+sec  60+ sec  60+ sec  60+ sec     6MWT NT meters in NT min        SLS EO 1 sec R/L 1 sec R/L 2 sec R/L 5 sec R/ 8 sec L      ABC Scale 58.13% w/ Rollator 56.88% w/ rollator 71.25% w/ rollator 70% w/ rollator           Cut off scores:  All data taken from [facility] Section or Rehab Measures    Campos: <46/56=falls risk  MDC: 6 pts  Age Norms:  60-69: M - 55   F - 55  70-79: M - 54   F - 53  80-89: M - 53   F - 50 5xSTS: Natan sher al 2010  MDC: 2.3 sec  Age Norms:  60-69: 11.1 sec  70-79: 12.6 sec  80-89: 14.8 sec   TUG  MDC: 4.14 sec  Cut off score:  >13.5 sec community dwelling adults  >32.2 sec frail elderly  <20 sec: I for basic transfers  >30: dependent for transfers 10 Meter Walk Test Norms: Julieta and  "Raffy et al 2011  20-29: M - 1.35 m   F - 1.34 m  30-39: M - 1.43 m   F - 1.34 m  40-49: M - 1.43 m   F - 1.39 m  50-59: M - 1.43 m   F - 1.31 m  60-69: M - 1.34 m   F - 1.24 m  70-79: M - 1.26 m   F - 1.13 m  80-89: M - 0.97 m   F - 0.94 m   FGA  MCID: 4 pts  Geriatrics/community < 22/30 fall risk  Geriatrics/community < 20/30 unexplained falls DGI  MDC: vestibular - 4 pts  MDC: geriatric/community - 3 pts  Falls risk <19/24   6 Minute Walk Test  Age Norms  60-69: M - 1876 ft (571.80 m)  F - 1765 ft (537.98 m)  70-79: M - 1729 ft (527.00 m)  F - 1545 ft (470.92 m)  80-89: M - 1368 ft (416.97 m)  F - 1286 ft (391.97 m) mCTSIB  Norm: 20-60 yrs  Eyes open firm: norm sway 0.21-0.48  Eyes closed firm: norm sway 0.48-0.99  Eyes open foam: norm sway 0.38-0.71  Eyes closed foam: norm sway 0.70-2.22           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    SOC: 7/8/2024  Re: 7/8/2024  POC EXP: 8/19/2024  DAILY TREATMENT LOG  date 7/8/2024  RE  6/24/2024 6/27/2024 7/2/2024   Visit #/auth   18 19 20   manual        ROM/MMT        Neuro Re-Ed 15'  15' 20' 20'   FGA  EM        ABC scale EM        mCTSIB EM        Sidestep no rail   10'x2 ea R/L supervision     Ambulat w/ HT    20'x1 horiz HT CS 12'x2 horiz HT CS    Retro walk    20'x1 CS  12'x2 CS w/ HT    FT W/ EC   On foam 30\"x1 CGA On foam 20\"x3 On foam 20\"x3   FT on foam w/ horiz HT   1x10 CGA 1x10 CGA 1x10 R/L CGA   Low mat STS w/ feet on 2\" foam    1x5; 2x seated foam on chiar w/ foam under feet  1x10 CS; 21\" mat feet on foam    Fwd step up non-recip to foam   Step up to 2\" foam 1x5 R/L CGA Step up 4\" +2\" foam pad CGA 1x Step up 4\" +2\" foam pad CGA 1x10 R/L    Hurdles near rail   High march fwd walk (no hurdles) 10'x4 CGA High march fwd walk (no hurdles) 10'x4 CGA 4 hurdles w/ CGA:   Fwd non-reciprocal 3 laps;   Lateral 2 laps R/L    Ther Ex 15'  25' 20' 20'   Recumb bike  L2x5' 45-50 RPM  L2x5' 45-50 RPM L2x5' 45-50 RPM L2x5' 45-50 RPM   Sup ham stretch w/ SOS " "20\"x3 R/L   20\"x3 R/L 20\"x3 R/L 20\"x3 R/L    Hip flexor stretch off edge   Off edge 1'x1 R/L Off edge 1'x1 R/L Off edge 1'x1 R/L    LAQ    2#; 3\" 1x10 R/L no airpod 2#; 3\" 1x10 R/L no airpod 2.5# 3\" 2x10    Upright leg press B/L 50# 2x10  B/L 50# 2x10 B/L 50# 2x10 B/L 50# 2x10   Rockerboard PD w/ rail for B/L gastroc stretch stand at rail 20x CS  20x CS 20x CS 20x CS   Stand alt hip abd vs TB YTB mid shin 1x10 R/L   YTB mid shin 1x10 R/L YTB mid shin 1x10 R/L YTB mid shin 1x10 R/L   Stand alt hip exten YTB mid shin 1x10 R/L   YTB mid shin 1x10 R/L YTB mid shin 1x10 R/L YTB mid shin 1x10 R/L   HEP        Ther Activity 15'  5' 5' 5'   bridges 2x10  2x10 no TB 2x10 no TB 2x10    5xSTS; TUG EM       Fwd step ups        Gait Training                        Modalities                        Access Code: [email or web address]  URL: [email or web address]  Date: 06/07/2024  Prepared by: Mary Alice Espinosa    Exercises  - Modified Kenroy Stretch  - 1 x daily - 2 reps - 1 min hold  - Hooklying Hamstring Stretch with Strap  - 1 x daily - 30 reps - 3 hold  - Standing Hip Extension with Resistance at Ankles and Unilateral Counter Support  - 1 x daily - 7 x weekly - 10 reps  - Heel Toe Raises with Counter Support  - 1 x daily - 7 x weekly - 1 sets - 10 reps  - Tandem Walking with Counter Support  - 1 x daily - 7 x weekly - 2 sets - 10 reps  - Supine Bridge with Resistance Band  - 1 x daily - 7 x weekly - 1 sets - 10 reps  - Side Stepping with Resistance at Ankles and Counter Support  - 1 x daily - 7 x weekly - 2 sets           "

## 2024-07-08 NOTE — LETTER
2024    Christopher Bull MD  [address] [place] [id number]    Patient: Juve Livingston   YOB: 1945   Date of Visit: 2024     Encounter Diagnosis     ICD-10-CM    1. Other fracture of unspecified thoracic vertebra, subsequent encounter for fracture with routine healing  S22.008D       2. Multiple myeloma in remission (HCC)  C90.01           Dear Dr. Bull:    Thank you for your recent referral of Juve Livingston. Please review the attached evaluation summary from Juve's recent visit.     Please verify that you agree with the plan of care by signing the attached order.     If you have any questions or concerns, please do not hesitate to call.     I sincerely appreciate the opportunity to share in the care of one of your patients and hope to have another opportunity to work with you in the near future.       Sincerely,    Milagros Ramirez      Referring Provider:      I certify that I have read the below Plan of Care and certify the need for these services furnished under this plan of treatment while under my care.                    Christopher Bull MD  [address] [place] [id number]  Via Fax: [phone]          PT Re-Evaluation   (Resolve rehabilitative care; start Maintenace)    Today's date: 2024  Patient name: Juve Livingston  : 1945  MRN: [id number]  Referring provider: Christopher Bull MD  Dx:   Encounter Diagnosis     ICD-10-CM    1. Other fracture of unspecified thoracic vertebra, subsequent encounter for fracture with routine healing  S22.008D       2. Multiple myeloma in remission (HCC)  C90.01                        Assessment  Impairments: abnormal or restricted ROM and impaired balance  Other impairment: poor tolerance to prolonged static positions    Assessment details: 2024: Pt has attended 21 skilled PT visits after today and has shown improvements in LE strength, endurance, LE flexibility and ROM. Pt has demonstrated improvement in 5xSTS, SLS time and  FGA score. Pt has plateaued w/ progress for TUG time and ABC scale indicating good candidate for maintenance pahse. Pt has had difficulty in the past with continuous use of HEP and has seen regressions with this. Plan to move into maintenance phase with no expected progression in LE strength or ROM but increased independence with HEP. PT would benefit from 1 x week skilled PT to maintain flexibility, LE strength and activity tolerance since starting PT.     4/29/2024: Pt returns today after 3 week break in care due to patient kimberli pneumonia. RE performed today due to prolonged illness to determine if any change in status. Pt demonstrates some decrease in MMT, balance testing fairly consistent w/ IE, flexibility remains unchanged. Pt presentation fairly consistent w/ SOC 4/3/2024. Plan of care will remain unchanged. Pt endurance does seem a bit regressed, but pt remains a good candidate for skilled PT. He was encouraged to resume his HEP as he feels able. Pt continues w/ Rollator walker for outings and intermittent use indoors.    4/3/2024: Juve Livingston is a 78 y.o. male who presents 2 years post thoracic fusion with lumbar with pain, decreased strength, decreased ROM, decreased joint mobility, ambulatory dysfunction, postural dysfunction, balance dysfunction, and decreased endurance. Due to these impairments, patient has difficulty performing ADL's, recreational activities, engaging in social activities, ambulation, stair negotiation, lifting/carrying, transfers, reaching. Patient's clinical presentation is consistent with their referring diagnosis of thoracic compression fx (s/p fusion) and general deconditioning.   Patient has been educated in home exercise program and plan of care. Patient would benefit from skilled physical therapy services to address their aforementioned functional limitations and progress towards prior level of function and independence with home exercise program and self symptom  management/resolution.     Understanding of Dx/Px/POC: good     Prognosis: good    Goals  Short Term Goals:  Target Date 5/1/2024  1. Initiate and advance HEP toward self symptom reduction/resolution.- met  2. Improve hip flexor flexibility to allow pt to stand fully upright and to report improved static standing tolerance to >/=10 min w/ 0-2/10 LBP. - met  3. Improve hamstring flexibility to 65 deg or better B/L to allow lumbar flexion to min robertson or better.- met  4. Improve LE strength such that pt can complete 5xSTS w/o need for UE assist. - met  5. Pt to report at least 50% reduction in pain upon raising from prolonged sitting. - met    Long Term Goals:  Target Date 7/26/2024 -  1. Indep with HEP and self symptom prevention. -met   2. Improve ABC scale (w/ RW) to >/=75% confidence. -not met  3. Improve LE strength to >/= 4/5 to allow ease w/ bridges, bed mobility and achieve 5xSTS w/o UE assist in 22 sec or less. -met  4. Improve balance such that pt can maintain SLS w/ EO x 5 sec or more R/L and achieve FGA score of 24/30 or better. - improved SLS; FGA score not met   5. Pt to be able to ambulate community distances w/ SPC or no AD. - able to; use AD due to symptoms from medications    New Maintenance Goals Created 7/8/2024: Target Date 8/19/2024  1. Indep with HEP at least 4-5 times a week.   2. Maintain ability to ambulate community distances w/ SPC or no AD.   3. Maintain hip flexor flexibility to allow pt to stand with upright posture.    4. Maintain ABC scale (w/ RW) to >=70% confidence.    5. Maintain LE strength to be able to complete 5xSTS w/o need for UE support.   6. Maintain FGA score of 20/30 or better.     Plan  Patient would benefit from: skilled physical therapy  Planned modality interventions: thermotherapy: hydrocollator packs and unattended electrical stimulation    Planned therapy interventions: joint mobilization, patient education, postural training, abdominal trunk stabilization, functional  "ROM exercises, home exercise program, neuromuscular re-education, strengthening, stretching, therapeutic activities and therapeutic exercise  Other planned therapy interventions: mechanical assessment    Frequency: 1x week  Plan of Care beginning date: 7/8/2024  Plan of Care expiration date: 8/19/2024  Treatment plan discussed with: patient      Subjective Evaluation    History of Present Illness  Mechanism of injury: Subjective   7/8/2024: Pt reports he feels good today. Pt reports that he is ready to move into maintenance phase and ready to be more indep with HEP. Pt reports he would still like to come once a week to avoid losing progress.     6/4/2024:   4/29/2024: Pt returns to PT after 3 week break in care due to pneumonia (5th time he's had it). He reports he's been working on walking in his home, but has not kept up w/ his HEP during the past 3 weeks. He denies any increase in pain or new pain. He reports mild decrease in his endurance, but thinks he's \"doing pretty good considering I had pneumonia\". He reports no change in his back pain.    4/3/2024: Pt reports to PT for general deconditioning, imbalance, LE weakness and also reports some back pain. He had a compression fx at T3 and subsequently had thoracic fusion surgery T2-T6 in September 2021. He was sent to rehab, but returned to the hospital due to pneumonia. He was soon after dx w/ melanoma. He underwent treatment for this and is currently in remission. He never completed rehab after his back surgery, citing multiple bouts of pneumonia, Covid and cancer treatments.   He c/o low back pain now, occurs w/ rising from prolonged sitting or standing; better as he walks. No UE or LE symptoms.   He c/o leg crams at night.   Pt has been using rollator walker for ambulation outside of his home since his back surgery. In his home, he ambulates w/o AD and denies holding onto furniture.  He arrives to PT w/ Rx from his surgeon Dr Ovalle for Thoracic fx rehab and " from Oncologist Dr Wright for general conditioning. He states he will follow up w/ oncologist, but may not follow up w/ ortho surgeon.   Patient Goals  Patient goals for therapy: increased strength, decreased pain, independence with ADLs/IADLs and return to sport/leisure activities  Patient goal: less back pain; stronger legs  Pain  At best pain ratin  At worst pain ratin  Progression: resolved    Treatments  Current treatment: physical therapy        Objective  Treatment and documentation completed by Milagros Ramirez SPT, direct supervision and review of documentation completed by Mary Alice Espinosa MPT, Cert MDT.     MYOTOMAL TESTIN/8/2024 2024 2024 2024 2024 2024 4/3/2024 4/3/2024     Right  Left  Right  Left  Right  Left  Right  Left  L2-3 Hip flexion:  5/5  5/5  5/5  5/5  4+/5  4+/5  5/5  4+/5  L3-4 Knee extension: 5/5  5/5   5/5  5/5  4+/5  4+/5  5/5  4+/5  L5 Great toe exten:  5/5  5/5  5/5  5/5  5/5  5/5  5/5  5/5  L4 Heel walk/DF:  5/5  5/5  5/5  5/5  5/5  5/5  5/5  5/5  S1 toe walk/PF/ever:  4+/5  4+/5  4/5  4/5  4/5  4/5  4/5  4/5  S2 knee flex:   4+/5  4+/5  4+/5  4+/5  4/5  4/5  4/5  4/5  Hip ER   5/5  5/5  5/5  5/5  4+/5  4+/5  4+/5  4+/5  Hip IR   5/5  5/5  5/5  5/5  5/5  5/5  4+/5  4+/5  Hip abd  5/5  5/5  5/5  5/5  4-/5  4-/5  4/5  4/5  Hip exten  4+/5  4+/5  4+/5  4+/5  4-/5  4-/5  3-/5  3+/5    LUMBAR AROM: 2024 2024 2024 4/3/2024  Flexion   Mod   Mod  Mod  Mod robertson (FT mid shin)  Extension  Onel   Onel  Onel  onel 90% robertson  R sideglide  Mod   Mod  Mod  Mod 60% robertson  L sideglide  Mod   Mod  Onel  Onel 80% robertson      FUNCTION:  2024: Pt uses RW for outings for stability since thor fusion 2 years ago - reports due to occasional lightheadedness from his usual meds   No difficulty w/ bridges/bed mobility   No remaining LBP w/ getting up from prolonged sitting and w/ static standing > 10 min   Quad lag B/L (knee ext qset/SLR): not tested today   Demonstrates flexed  posture in standing (hip flexor tightness) -improved   Limited forward bending due to hamstring tightness  6/4/2024: Pt uses RW for outings for stability since thor fusion 2 years ago - reports due to occasional lightheadedness from his usual meds   no difficulty w/ bridges/bed mobility   No remaining LBP w/ getting up from prolonged sitting and w/ static standing > 10 min   Quad lag B/L (knee ext qset/SLR): not tested today   Demonstrates flexed posture in standing (hip flexor tightness)   Limited forward bending due to hamstring tightness  4/29/2024: Pt is limited with ambulation w/o AD - uses RW for outings for stability since thor fusion 2 years ago   Mod difficulty w/ bridges/bed mobility   LBP w/ getting up from prolonged sitting and w/ static standing > 10 min   Quad lag B/L (knee ext qset/SLR): R +2/-4; L 0/-4   Demonstrates flexed posture in standing (hip flexor tightness)   Limited forward bending due to hamstring tightness  4/3/2024: Pt is limited with ambulation w/o AD - uses RW for outings for stability since thor fusion 2 years ago   Mod difficulty w/ bridges/bed mobility   LBP w/ getting up from prolonged sitting and w/ static standing > 5 min   Quad lag B/L (knee ext qset/SLR): R +2/-4; L 0/-4   Demonstrates flexed posture in standing (hip flexor tightness)   Limited forward bending due to hamstring tightness      FLEXIBILITY:  7/8/2024:hamstring flexibility 85 deg B/L   Quads  B/L in prone  6/4/2024: hamstring flexibility 60 deg B/L   Quads WNL R 115; L 120 in prone  4/29/2024: hamsrting flexibility mod/tera robertson B/L 60 deg   Quads min robertson B/L prone knee flex R110/ L 120; hip flexors mod robertson B/L   4/3/2024: Hamstring flexibility tera robertson B/L 55 deg         Quad/hip flexor flexibitlity mod robertson R/min robertson L -  knee flex sup/prone R- 105/113; L 120/124        Pirif flexibility mod/tera robertson B/L       Outcome Measures Initial Eval  4/3/2024 RE  4/29/2024 RE   6/4/2024 RE   7/8/2024     5xSTS 35 sec  minor use of hands 29.5 SEC min use hands; 7/10 fatigue 18 sec no use UE's 17.5 sec no use of UE's     TUG 14.67 sec no AD 16 sec no AD 11.5 sec no AD 14.8 sec no AD      10 meter NT sec =  NT m/s        CAMPOS NT/56        FGA 13/30 4/5/2024 13/30 18/30 20/30     mCTSIB  - FTEO (firm)  - FTEC (firm)  - FTEO (foam)  - FTEC (foam)   60+ sec  8 sec  60+ sec  3 sec   60+ sec  60+ sec  60+ sec  5 sec   60+sec  60+ sec  60+ sec  60+ sec   60+sec  60+ sec  60+ sec  60+ sec     6MWT NT meters in NT min        SLS EO 1 sec R/L 1 sec R/L 2 sec R/L 5 sec R/ 8 sec L      ABC Scale 58.13% w/ Rollator 56.88% w/ rollator 71.25% w/ rollator 70% w/ rollator           Cut off scores:  All data taken from [facility] or Rehab Measures    Campos: <46/56=falls risk  MDC: 6 pts  Age Norms:  60-69: M - 55   F - 55  70-79: M - 54   F - 53  80-89: M - 53   F - 50 5xSTS: Natan et al 2010  MDC: 2.3 sec  Age Norms:  60-69: 11.1 sec  70-79: 12.6 sec  80-89: 14.8 sec   TUG  MDC: 4.14 sec  Cut off score:  >13.5 sec community dwelling adults  >32.2 sec frail elderly  <20 sec: I for basic transfers  >30: dependent for transfers 10 Meter Walk Test Norms: Julieta and Raffy et al 2011  20-29: M - 1.35 m   F - 1.34 m  30-39: M - 1.43 m   F - 1.34 m  40-49: M - 1.43 m   F - 1.39 m  50-59: M - 1.43 m   F - 1.31 m  60-69: M - 1.34 m   F - 1.24 m  70-79: M - 1.26 m   F - 1.13 m  80-89: M - 0.97 m   F - 0.94 m   [facility]  MCID: 4 pts  Geriatrics/community < 22/30 fall risk  Geriatrics/community < 20/30 unexplained falls DGI  MDC: vestibular - 4 pts  MDC: geriatric/community - 3 pts  Falls risk <19/24   6 Minute Walk Test  Age Norms  60-69: M - 1876 ft (571.80 m)  F - 1765 ft (537.98 m)  70-79: M - 1729 ft (527.00 m)  F - 1545 ft (470.92 m)  80-89: M - 1368 ft (416.97 m)  F - 1286 ft (391.97 m) mCTSIB  Norm: 20-60 yrs  Eyes open firm: norm sway 0.21-0.48  Eyes closed firm: norm sway 0.48-0.99  Eyes open foam: norm sway 0.38-0.71  Eyes closed foam: norm sway  "0.70-2.22           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    SOC: 7/8/2024  Re: 7/8/2024  POC EXP: 8/19/2024  DAILY TREATMENT LOG  date 7/8/2024  RE  6/24/2024 6/27/2024 7/2/2024   Visit #/auth   18 19 20   manual        ROM/MMT        Neuro Re-Ed 15'  15' 20' 20'   FGA  EM        ABC scale EM        mCTSIB EM        Sidestep no rail   10'x2 ea R/L supervision     Ambulat w/ HT    20'x1 horiz HT CS 12'x2 horiz HT CS    Retro walk    20'x1 CS  12'x2 CS w/ HT    FT W/ EC   On foam 30\"x1 CGA On foam 20\"x3 On foam 20\"x3   FT on foam w/ horiz HT   1x10 CGA 1x10 CGA 1x10 R/L CGA   Low mat STS w/ feet on 2\" foam    1x5; 2x seated foam on chiar w/ foam under feet  1x10 CS; 21\" mat feet on foam    Fwd step up non-recip to foam   Step up to 2\" foam 1x5 R/L CGA Step up 4\" +2\" foam pad CGA 1x Step up 4\" +2\" foam pad CGA 1x10 R/L    Hurdles near rail   High march fwd walk (no hurdles) 10'x4 CGA High march fwd walk (no hurdles) 10'x4 CGA 4 hurdles w/ CGA:   Fwd non-reciprocal 3 laps;   Lateral 2 laps R/L    Ther Ex 15'  25' 20' 20'   Recumb bike  L2x5' 45-50 RPM  L2x5' 45-50 RPM L2x5' 45-50 RPM L2x5' 45-50 RPM   Sup ham stretch w/ SOS 20\"x3 R/L   20\"x3 R/L 20\"x3 R/L 20\"x3 R/L    Hip flexor stretch off edge   Off edge 1'x1 R/L Off edge 1'x1 R/L Off edge 1'x1 R/L    LAQ    2#; 3\" 1x10 R/L no airpod 2#; 3\" 1x10 R/L no airpod 2.5# 3\" 2x10    Upright leg press B/L 50# 2x10  B/L 50# 2x10 B/L 50# 2x10 B/L 50# 2x10   Rockerboard PD w/ rail for B/L gastroc stretch stand at rail 20x CS  20x CS 20x CS 20x CS   Stand alt hip abd vs TB YTB mid shin 1x10 R/L   YTB mid shin 1x10 R/L YTB mid shin 1x10 R/L YTB mid shin 1x10 R/L   Stand alt hip exten YTB mid shin 1x10 R/L   YTB mid shin 1x10 R/L YTB mid shin 1x10 R/L YTB mid shin 1x10 R/L   HEP        Ther Activity 15'  5' 5' 5'   bridges 2x10  2x10 no TB 2x10 no TB 2x10    5xSTS; TUG EM       Fwd step ups        Gait Training                        Modalities                      "   Access Code: [email or web address]  URL: [email or web address]  Date: 06/07/2024  Prepared by: Mary Alice Espinosa    Exercises  - Modified Kenroy Stretch  - 1 x daily - 2 reps - 1 min hold  - Hooklying Hamstring Stretch with Strap  - 1 x daily - 30 reps - 3 hold  - Standing Hip Extension with Resistance at Ankles and Unilateral Counter Support  - 1 x daily - 7 x weekly - 10 reps  - Heel Toe Raises with Counter Support  - 1 x daily - 7 x weekly - 1 sets - 10 reps  - Tandem Walking with Counter Support  - 1 x daily - 7 x weekly - 2 sets - 10 reps  - Supine Bridge with Resistance Band  - 1 x daily - 7 x weekly - 1 sets - 10 reps  - Side Stepping with Resistance at Ankles and Counter Support  - 1 x daily - 7 x weekly - 2 sets             Attestation signed by Mary Alice Espinosa PT at 7/8/2024  1:14 PM:  I supervised the visit.  We discussed the case to ensure appropriate continuation and progression of care and I reviewed the documentation.

## 2024-07-11 ENCOUNTER — OFFICE VISIT (OUTPATIENT)
Dept: PHYSICAL THERAPY | Facility: CLINIC | Age: 79
End: 2024-07-11
Payer: MEDICARE

## 2024-07-11 DIAGNOSIS — S22.008D OTHER FRACTURE OF UNSPECIFIED THORACIC VERTEBRA, SUBSEQUENT ENCOUNTER FOR FRACTURE WITH ROUTINE HEALING: Primary | ICD-10-CM

## 2024-07-11 DIAGNOSIS — C90.01 MULTIPLE MYELOMA IN REMISSION (HCC): ICD-10-CM

## 2024-07-11 PROCEDURE — 97110 THERAPEUTIC EXERCISES: CPT | Performed by: PHYSICAL THERAPIST

## 2024-07-11 PROCEDURE — 97112 NEUROMUSCULAR REEDUCATION: CPT | Performed by: PHYSICAL THERAPIST

## 2024-07-11 NOTE — PROGRESS NOTES
"Daily Note     Today's date: 2024  Patient name: Juve Livingston  : 1945  MRN: [id number]  Referring provider: Christopher Bull MD  Dx:   Encounter Diagnosis     ICD-10-CM    1. Other fracture of unspecified thoracic vertebra, subsequent encounter for fracture with routine healing  S22.008D       2. Multiple myeloma in remission (HCC)  C90.01                      Subjective: Pt reports that he is doing well and completed his HEP yesterday. Pt reports that HEP is becoming easier.       Objective: Treatment and documentation completed by Milagros Ramirez SPT, direct supervision and review of documentation completed by Mary Alice Espinosa MPT, Cert MDT. See treatment diary below.       Assessment: Tolerated treatment well. Pt tolerated exercises well today w/ progression of exercises. Pt also demonstrated improvement in activity tolerance throughout today's session. Pt was educated on importance of HEP 3-5 x per week. Updated HEP was provided to patient to progress strengthening exercises. Patient exhibited good technique with therapeutic exercises and would benefit from continued PT.       Plan: Progress treatment as tolerated.       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    SOC: 2024  Re: 2024  POC EXP: 2024  DAILY TREATMENT LOG  date 2024  RE 2024   Visit #/auth 21 22   20   manual        ROM/MMT        Neuro Re-Ed 15' 15'   20'   FGA  EM        ABC scale EM        mCTSIB EM        Sidestep no rail        Ambulat w/ HT  14'x2 horiz HT CS    12'x2 horiz HT CS    Retro walk  14'x2 CS w/ HT    12'x2 CS w/ HT    FT W/ EC  On foam 20\"x3   On foam 20\"x3   FT on foam w/ horiz HT  1x10 R/L    1x10 R/L CGA   Low mat STS w/ feet on 2\" foam  STS from chair 1x10   1x10 CS; 21\" mat feet on foam    Fwd step up non-recip to foam     Step up 4\" +2\" foam pad CGA 1x10 R/L    Hurdles near rail   5 hurdles w/ CGA:   Fwd non-reciprocal w/ foam pad in middle   3 laps;   Lateral 2 laps R/L   4 " "hurdles w/ CGA:   Fwd non-reciprocal 3 laps;   Lateral 2 laps R/L    Ther Ex 15' 20'   20'   Recumb bike  L2x5' 45-50 RPM L2x5' 45-50 RPM   L2x5' 45-50 RPM   Sup ham stretch w/ SOS 20\"x3 R/L  20\"x3 R/L    20\"x3 R/L    Hip flexor stretch off edge  Off edge 1'x1 R/L   Off edge 1'x1 R/L    LAQ   W/ red TB 2x10   2.5# 3\" 2x10    Upright leg press B/L 50# 2x10 B/L 50# 2x10   B/L 50# 2x10   Rockerboard PD w/ rail for B/L gastroc stretch stand at rail 20x CS 20x CS   20x CS   Stand alt hip abd vs TB YTB mid shin 1x10 R/L     YTB mid shin 1x10 R/L   Stand alt hip exten YTB mid shin 1x10 R/L     YTB mid shin 1x10 R/L   Monster walks   YTB 10'x2  RTB     HEP  Update & review       Ther Activity 15'    5'   bridges 2x10    2x10    5xSTS; TUG EM       Fwd step ups        Gait Training                        Modalities                        Access Code: [email or web address]  URL: [email or web address]  Date: 07/11/2024  Prepared by: Mary Alice Espinosa    Exercises  - Modified Kenroy Stretch  - 1 x daily - 2 reps - 1 min hold  - Hooklying Hamstring Stretch with Strap  - 1 x daily - 30 reps - 3 hold  - Supine Bridge with Resistance Band  - 1 x daily - 7 x weekly - 1 sets - 10 reps  - Side Stepping with Resistance at Ankles and Counter Support  - 1 x daily - 7 x weekly - 2 sets  - Standing Hip Extension with Resistance at Ankles and Unilateral Counter Support  - 1 x daily - 7 x weekly - 2 sets - 10 reps  - Sit to Stand  - 1 x daily - 7 x weekly - 1 sets - 10 reps  - Sitting Knee Extension with Resistance  - 1 x daily - 7 x weekly - 2 sets - 10 reps  - Tandem Walking with Counter Support  - 1 x daily - 7 x weekly - 3 sets - 10 reps  - Forward Monster Walks  - 1 x daily - 7 x weekly - 3 sets - 10 reps           "

## 2024-07-15 ENCOUNTER — OFFICE VISIT (OUTPATIENT)
Dept: PHYSICAL THERAPY | Facility: CLINIC | Age: 79
End: 2024-07-15
Payer: MEDICARE

## 2024-07-15 DIAGNOSIS — S22.008D OTHER FRACTURE OF UNSPECIFIED THORACIC VERTEBRA, SUBSEQUENT ENCOUNTER FOR FRACTURE WITH ROUTINE HEALING: Primary | ICD-10-CM

## 2024-07-15 DIAGNOSIS — C90.01 MULTIPLE MYELOMA IN REMISSION (HCC): ICD-10-CM

## 2024-07-15 PROCEDURE — 97110 THERAPEUTIC EXERCISES: CPT | Performed by: PHYSICAL THERAPIST

## 2024-07-15 PROCEDURE — 97112 NEUROMUSCULAR REEDUCATION: CPT | Performed by: PHYSICAL THERAPIST

## 2024-07-15 NOTE — PROGRESS NOTES
"Daily Note     Today's date: 7/15/2024  Patient name: Juve Livingston  : 1945  MRN: [id number]  Referring provider: Christopher Bull MD  Dx:   Encounter Diagnosis     ICD-10-CM    1. Other fracture of unspecified thoracic vertebra, subsequent encounter for fracture with routine healing  S22.008D       2. Multiple myeloma in remission (HCC)  C90.01                      Subjective: pt reports his legs are much stronger since starting PT. He didn't do his HEP yesterday, but did it the 2 days prior.      Objective: See treatment diary below; updated HEP.      Assessment: Tolerated treatment well and was again educ re: maint program 1x/week w/ emphasis on indep w/ HEP to prepare for D/C . Patient would benefit from continued PT and review/update to HEP as needed. Pt not relying on RW in clinic.      Plan: Continue per plan of care.      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    SOC: 2024  Re: 2024  POC EXP: 2024  DAILY TREATMENT LOG  date 2024  RE 2024 7/15/2024  2024   Visit #/auth 21 22 23  20   manual        ROM/MMT        Neuro Re-Ed 15' 15' 20'  20'   FGA  EM        ABC scale EM        mCTSIB EM        Sidestep no rail   RTB @ ankles 10'x2 ea R/L CS             Monster walk TB @ ankles   RTB 10'x4 CS     Ambulat w/ HT  14'x2 horiz HT CS    12'x2 horiz HT CS    Retro walk  14'x2 CS w/ HT    12'x2 CS w/ HT    FT W/ EC  On foam 20\"x3   On foam 20\"x3   FT on foam w/ horiz HT  1x10 R/L    1x10 R/L CGA   Low mat STS w/ feet on 2\" foam  STS from chair 1x10 Low mat 22\" w/ feet on 2\" foam CS 2x10  1x10 CS; 21\" mat feet on foam    Fwd step up non-recip to foam   6\" step (no foam) 1x10 R/L CGA no rail  Step up 4\" +2\" foam pad CGA 1x10 R/L    Hurdles near rail   5 hurdles w/ CGA:   Fwd non-reciprocal w/ foam pad in middle   3 laps;   Lateral 2 laps R/L 4 hurdles; CS; recip fwd 4x; side step sideways 2x ea  4 hurdles w/ CGA:   Fwd non-reciprocal 3 laps;   Lateral 2 laps R/L    Ther " "Ex 15' 20' 20'  20'   Recumb bike  L2x5' 45-50 RPM L2x5' 45-50 RPM L 2x5' 45-50 RPM  L2x5' 45-50 RPM   Sup ham stretch w/ SOS 20\"x3 R/L  20\"x3 R/L  20\"x3 R/L  20\"x3 R/L    Hip flexor stretch off edge  Off edge 1'x1 R/L Off edge 1'x1 R/L  Off edge 1'x1 R/L    LAQ   W/ red TB 2x10 Yellow loop around ankles 1x10 R/L  2.5# 3\" 2x10    Upright leg press B/L 50# 2x10 B/L 50# 2x10 B/L 50# 2x10  B/L 50# 2x10   Rockerboard PD w/ rail for B/L gastroc stretch stand at rail 20x CS 20x CS   20x CS   Stand alt hip abd vs TB YTB mid shin 1x10 R/L     YTB mid shin 1x10 R/L   Stand alt hip exten YTB mid shin 1x10 R/L   RTB @ ankles 2x10 R/L  YTB mid shin 1x10 R/L   Monster walks   YTB 10'x2  RTB @ ankles 10'x4 CS     HEP  Update & review  Updated & review     Ther Activity 15'  5'  5'   bridges 2x10  W/ RTB 2x10  2x10    5xSTS; TUG EM       Fwd step ups        Gait Training                        Modalities                        HEP:  Access Code: [email or web address]  URL: [email or web address]  Date: 07/15/2024  Prepared by: Mary Alice Espinosa    Exercises  - Modified Kenroy Stretch  - 1 x daily - 2 reps - 1 min hold  - Hooklying Hamstring Stretch with Strap  - 1 x daily - 30 reps - 3 hold  - Supine Bridge with Resistance Band  - 1 x daily - 7 x weekly - 1 sets - 10 reps  - Side Stepping with Resistance at Ankles and Counter Support  - 1 x daily - 7 x weekly - 2 sets  - Standing Hip Extension with Resistance at Ankles and Unilateral Counter Support  - 1 x daily - 7 x weekly - 2 sets - 10 reps  - Sit to Stand  - 1 x daily - 7 x weekly - 1 sets - 10 reps  - Sitting Knee Extension with Resistance  - 1 x daily - 7 x weekly - 2 sets - 10 reps  - Tandem Walking with Counter Support  - 1 x daily - 7 x weekly - 3 sets - 10 reps  - Forward Monster Walks  - 1 x daily - 7 x weekly - 3 sets - 10 reps  - Walking March  - 1 x daily - 7 x weekly - 3 sets - 10 reps               "

## 2024-07-18 ENCOUNTER — APPOINTMENT (OUTPATIENT)
Dept: PHYSICAL THERAPY | Facility: CLINIC | Age: 79
End: 2024-07-18
Payer: MEDICARE

## 2024-07-25 ENCOUNTER — OFFICE VISIT (OUTPATIENT)
Dept: PHYSICAL THERAPY | Facility: CLINIC | Age: 79
End: 2024-07-25
Payer: MEDICARE

## 2024-07-25 DIAGNOSIS — C90.01 MULTIPLE MYELOMA IN REMISSION (HCC): ICD-10-CM

## 2024-07-25 DIAGNOSIS — S22.008D OTHER FRACTURE OF UNSPECIFIED THORACIC VERTEBRA, SUBSEQUENT ENCOUNTER FOR FRACTURE WITH ROUTINE HEALING: Primary | ICD-10-CM

## 2024-07-25 PROCEDURE — 97112 NEUROMUSCULAR REEDUCATION: CPT

## 2024-07-25 PROCEDURE — 97110 THERAPEUTIC EXERCISES: CPT

## 2024-07-25 NOTE — PROGRESS NOTES
"Daily Note     Today's date: 2024  Patient name: Juve Livingston  : 1945  MRN: [id number]  Referring provider: Christopher Bull MD  Dx:   Encounter Diagnosis     ICD-10-CM    1. Other fracture of unspecified thoracic vertebra, subsequent encounter for fracture with routine healing  S22.008D       2. Multiple myeloma in remission (HCC)  C90.01                      Subjective: Pt reports doing really well and complete HEP in the morning each day. Pt also reports no longer using rolling walker when walking short distances because he is feeling more steady and stronger. He does bring walker when going on longer walks or bigger stores ([employer]) but not to smaller stores such as [employer].       Objective: Treatment and documentation completed by Milagros Ramirez SPT, direct supervision and review of documentation completed by Mary Alice Espinosa MPT, Cert MDT  See treatment diary below      Assessment: Tolerated treatment well. Pt is tolerated progression to exercises well today. Pt demonstrates improvement in gait pattern and stride length. Pt continues to demonstrate improvement in activity tolerance throughout treatments. Patient exhibited good technique with therapeutic exercises and would benefit from continued PT and continuation of daily HEP.       Plan: Continue per plan of care.  Progress treatment as tolerated.       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    SOC: 2024  Re: 2024  POC EXP: 2024  DAILY TREATMENT LOG  date 2024  RE 2024 7/15/2024 2024    Visit #/auth 21 22 23 24    manual        ROM/MMT        Neuro Re-Ed 15' 15' 20' 20'    FGA  EM        ABC scale EM        mCTSIB EM        Sidestep no rail   RTB @ ankles 10'x2 ea R/L CS RTB @ ankles 10'x4 ea R/L CS    Monster walk TB @ ankles   RTB 10'x4 CS RTB 10'x4 CS    Ambulat w/ HT  14'x2 horiz HT CS       Retro walk  14'x2 CS w/ HT       FT W/ EC  On foam 20\"x3      FT on foam w/ horiz HT  1x10 R/L       Low mat STS w/ " "feet on 2\" foam  STS from chair 1x10 Low mat 22\" w/ feet on 2\" foam CS 2x10 Low mat 21\" w/ feet on 2\" foam CS 2x10    Fwd step up non-recip to foam   6\" step (no foam) 1x10 R/L CGA no rail 6\" step (no foam) 1x10 R/L CGA no rail    Hurdles near rail   5 hurdles w/ CGA:   Fwd non-reciprocal w/ foam pad in middle   3 laps;   Lateral 2 laps R/L 4 hurdles; CS; recip fwd 4x; side step sideways 2x ea 4 hurdles; CS; recip fwd 4x; side step sideways 4x ea    Cone taps     2x10 R/L CS no rail     Ther Ex 15' 20' 20' 20'    Recumb bike  L2x5' 45-50 RPM L2x5' 45-50 RPM L 2x5' 45-50 RPM L2x5' 45-50 RPM    Sup ham stretch w/ SOS 20\"x3 R/L  20\"x3 R/L  20\"x3 R/L 30\"x3 R/L     Hip flexor stretch off edge  Off edge 1'x1 R/L Off edge 1'x1 R/L Off edge 1'x1 R/L    LAQ   W/ red TB 2x10 Yellow loop around ankles 1x10 R/L Yellow loop around ankles 1x10 R/L    Upright leg press B/L 50# 2x10 B/L 50# 2x10 B/L 50# 2x10 B/L 50# 2x10    Rockerboard PD w/ rail for B/L gastroc stretch stand at rail 20x CS 20x CS      Stand alt hip abd vs TB YTB mid shin 1x10 R/L        Stand alt hip exten YTB mid shin 1x10 R/L   RTB @ ankles 2x10 R/L RTB @ ankles 2x10 R/L    Monster walks   YTB 10'x2  RTB @ ankles 10'x4 CS RTB @ ankles 10'x4 CS    HEP  Update & review  Updated & review     Ther Activity 15'  5' 5'    bridges 2x10  W/ RTB 2x10 RTB 2x10    5xSTS; TUG EM       Fwd step ups        Gait Training                        Modalities                        HEP:  Access Code: [email or web address]  URL: [email or web address]  Date: 07/15/2024  Prepared by: Mary Alice Espinosa    Exercises  - Modified Kenroy Stretch  - 1 x daily - 2 reps - 1 min hold  - Hooklying Hamstring Stretch with Strap  - 1 x daily - 30 reps - 3 hold  - Supine Bridge with Resistance Band  - 1 x daily - 7 x weekly - 1 sets - 10 reps  - Side Stepping with Resistance at Ankles and Counter Support  - 1 x daily - 7 x weekly - 2 sets  - Standing Hip Extension with Resistance at Ankles and " Unilateral Counter Support  - 1 x daily - 7 x weekly - 2 sets - 10 reps  - Sit to Stand  - 1 x daily - 7 x weekly - 1 sets - 10 reps  - Sitting Knee Extension with Resistance  - 1 x daily - 7 x weekly - 2 sets - 10 reps  - Tandem Walking with Counter Support  - 1 x daily - 7 x weekly - 3 sets - 10 reps  - Forward Monster Walks  - 1 x daily - 7 x weekly - 3 sets - 10 reps  - Walking March  - 1 x daily - 7 x weekly - 3 sets - 10 reps

## 2024-07-26 ENCOUNTER — TELEPHONE (OUTPATIENT)
Dept: ENDOCRINOLOGY | Facility: CLINIC | Age: 79
End: 2024-07-26

## 2024-07-26 NOTE — TELEPHONE ENCOUNTER
Called patient to offer sooner appointment if still available 87-2-2024 @ 10:00. Left a voicemail. Patient currently scheduled for 9-4-2024.

## 2024-07-26 NOTE — TELEPHONE ENCOUNTER
Wife called back bc she missed a call. Spoke to jessica in the office and unfortunately the appt spot offered is no longer available.

## 2024-08-01 ENCOUNTER — APPOINTMENT (OUTPATIENT)
Dept: PHYSICAL THERAPY | Facility: CLINIC | Age: 79
End: 2024-08-01
Payer: MEDICARE

## 2024-08-01 ENCOUNTER — TELEPHONE (OUTPATIENT)
Dept: ENDOCRINOLOGY | Facility: CLINIC | Age: 79
End: 2024-08-01

## 2024-08-01 NOTE — TELEPHONE ENCOUNTER
Called and spoke to  Silvia at PCP. She  is faxing recent office visit and records pertaining to diagnosis.

## 2024-08-08 ENCOUNTER — TELEPHONE (OUTPATIENT)
Dept: PHYSICAL THERAPY | Facility: CLINIC | Age: 79
End: 2024-08-08

## 2024-08-08 ENCOUNTER — APPOINTMENT (OUTPATIENT)
Dept: PHYSICAL THERAPY | Facility: CLINIC | Age: 79
End: 2024-08-08
Payer: MEDICARE

## 2024-08-13 ENCOUNTER — OFFICE VISIT (OUTPATIENT)
Dept: PHYSICAL THERAPY | Facility: CLINIC | Age: 79
End: 2024-08-13
Payer: MEDICARE

## 2024-08-13 DIAGNOSIS — C90.01 MULTIPLE MYELOMA IN REMISSION (HCC): ICD-10-CM

## 2024-08-13 DIAGNOSIS — S22.008D OTHER FRACTURE OF UNSPECIFIED THORACIC VERTEBRA, SUBSEQUENT ENCOUNTER FOR FRACTURE WITH ROUTINE HEALING: Primary | ICD-10-CM

## 2024-08-13 PROCEDURE — 97112 NEUROMUSCULAR REEDUCATION: CPT | Performed by: PHYSICAL THERAPIST

## 2024-08-13 PROCEDURE — 97110 THERAPEUTIC EXERCISES: CPT | Performed by: PHYSICAL THERAPIST

## 2024-08-13 NOTE — PROGRESS NOTES
PT Discharge  (Skilled Maintenance)    Today's date: 2024  Patient name: Juve Livingston  : 1945  MRN: [id number]  Referring provider: Christopher Bull MD  Dx:   Encounter Diagnosis     ICD-10-CM    1. Other fracture of unspecified thoracic vertebra, subsequent encounter for fracture with routine healing  S22.008D       2. Multiple myeloma in remission (HCC)  C90.01                        Assessment  Impairments: abnormal or restricted ROM and impaired balance  Other impairment: poor tolerance to prolonged static positions    Assessment details: 2024: Pt has attended 5 skilled PT visits as a skilled maintenance patient over the past 5 weeks and has maintained gains in MMT, balance (FGA), 5xSTS, TUG, ABC scale and SLS - many have actually improved slightly. He did demonstrate a reduction in feet together w/ EC on foam today. Pt was again educated on importance of continued HEP to maintain gains he has made. He even arrived to PT today w/o his RW. Plan is to D/c to HEP today, pt understands he may return to PT in a few months should he feel he is regressing again.     2024: Pt has attended 21 skilled PT visits after today and has shown improvements in LE strength, endurance, LE flexibility and ROM. Pt has demonstrated improvement in 5xSTS, SLS time and FGA score. Pt has plateaued w/ progress for TUG time and ABC scale indicating good candidate for maintenance pahse. Pt has had difficulty in the past with continuous use of HEP and has seen regressions with this. Plan to move into maintenance phase with no expected progression in LE strength or ROM but increased independence with HEP. PT would benefit from 1 x week skilled PT to maintain flexibility, LE strength and activity tolerance since starting PT.     2024: Pt returns today after 3 week break in care due to patient kimberli pneumonia. RE performed today due to prolonged illness to determine if any change in status. Pt demonstrates some  decrease in MMT, balance testing fairly consistent w/ IE, flexibility remains unchanged. Pt presentation fairly consistent w/ SOC 4/3/2024. Plan of care will remain unchanged. Pt endurance does seem a bit regressed, but pt remains a good candidate for skilled PT. He was encouraged to resume his HEP as he feels able. Pt continues w/ Rollator walker for outings and intermittent use indoors.    4/3/2024: Juve Livingston is a 78 y.o. male who presents 2 years post thoracic fusion with lumbar with pain, decreased strength, decreased ROM, decreased joint mobility, ambulatory dysfunction, postural dysfunction, balance dysfunction, and decreased endurance. Due to these impairments, patient has difficulty performing ADL's, recreational activities, engaging in social activities, ambulation, stair negotiation, lifting/carrying, transfers, reaching. Patient's clinical presentation is consistent with their referring diagnosis of thoracic compression fx (s/p fusion) and general deconditioning.   Patient has been educated in home exercise program and plan of care. Patient would benefit from skilled physical therapy services to address their aforementioned functional limitations and progress towards prior level of function and independence with home exercise program and self symptom management/resolution.     Understanding of Dx/Px/POC: good     Prognosis: good    Goals  Short Term Goals:  Target Date 5/1/2024  1. Initiate and advance HEP toward self symptom reduction/resolution.- met  2. Improve hip flexor flexibility to allow pt to stand fully upright and to report improved static standing tolerance to >/=10 min w/ 0-2/10 LBP. - met  3. Improve hamstring flexibility to 65 deg or better B/L to allow lumbar flexion to min robertson or better.- met  4. Improve LE strength such that pt can complete 5xSTS w/o need for UE assist. - met  5. Pt to report at least 50% reduction in pain upon raising from prolonged sitting. - met    Long Term  Goals:  Target Date 7/26/2024 -  1. Indep with HEP and self symptom prevention. -met   2. Improve ABC scale (w/ RW) to >/=75% confidence. - met  3. Improve LE strength to >/= 4/5 to allow ease w/ bridges, bed mobility and achieve 5xSTS w/o UE assist in 22 sec or less. -met  4. Improve balance such that pt can maintain SLS w/ EO x 5 sec or more R/L and achieve FGA score of 24/30 or better. -  met   5. Pt to be able to ambulate community distances w/ SPC or no AD. - able to; use AD due to symptoms from medications    New Maintenance Goals Created 7/8/2024: Target Date 8/19/2024  1. Indep with HEP at least 4-5 times a week. - met  2. Maintain ability to ambulate community distances w/ SPC or no AD. - met, uses RW for longer/unfamiliar outings as a precaution  3. Maintain hip flexor flexibility to allow pt to stand with upright posture.  - met  4. Maintain ABC scale (w/ RW) to >=70% confidence.  - met  5. Maintain LE strength to be able to complete 5xSTS w/o need for UE support. - met  6. Maintain FGA score of 24/30 or better. -met/exceeded    Plan    Planned therapy interventions: home exercise program  Other planned therapy interventions: mechanical assessment    Frequency: 1x week  Plan of Care beginning date: 7/8/2024  Plan of Care expiration date: 8/19/2024  Treatment plan discussed with: patient      Subjective Evaluation    History of Present Illness  Mechanism of injury: Subjective   8/13/2024: Pt reports a few days of fatigue post infusions early last week. He missed doing his HEP for a few days, but otherwise has been pretty consistent w/ doing his HEP and his happy w/ current status.    7/8/2024: Pt reports he feels good today. Pt reports that he is ready to move into maintenance phase and ready to be more indep with HEP. Pt reports he would still like to come once a week to avoid losing progress.     6/4/2024:   4/29/2024: Pt returns to PT after 3 week break in care due to pneumonia (5th time he's had it).  "He reports he's been working on walking in his home, but has not kept up w/ his HEP during the past 3 weeks. He denies any increase in pain or new pain. He reports mild decrease in his endurance, but thinks he's \"doing pretty good considering I had pneumonia\". He reports no change in his back pain.    4/3/2024: Pt reports to PT for general deconditioning, imbalance, LE weakness and also reports some back pain. He had a compression fx at T3 and subsequently had thoracic fusion surgery T2-T6 in 2021. He was sent to rehab, but returned to the hospital due to pneumonia. He was soon after dx w/ melanoma. He underwent treatment for this and is currently in remission. He never completed rehab after his back surgery, citing multiple bouts of pneumonia, Covid and cancer treatments.   He c/o low back pain now, occurs w/ rising from prolonged sitting or standing; better as he walks. No UE or LE symptoms.   He c/o leg crams at night.   Pt has been using rollator walker for ambulation outside of his home since his back surgery. In his home, he ambulates w/o AD and denies holding onto furniture.  He arrives to PT w/ Rx from his surgeon Dr Ovalle for Thoracic fx rehab and from Oncologist Dr Wright for general conditioning. He states he will follow up w/ oncologist, but may not follow up w/ ortho surgeon.   Patient Goals  Patient goals for therapy: increased strength, decreased pain, independence with ADLs/IADLs and return to sport/leisure activities  Patient goal: less back pain; stronger legs  Pain  At best pain ratin  At worst pain ratin  Progression: resolved    Treatments  Current treatment: physical therapy        Objective      MYOTOMAL TESTIN2024 2024 2024 2024 2024 2024 2024 2024 4/3/2024 4/3/2024     Right  Left  Right  Left  Right  Left  Right  Left  Right  Left  L2-3 Hip flex:  5/5 5/5  5/5  5/5  5/5  5/5  5/5  4+/5  4+/5  5/5  4+/5  L3-4 Knee " ext: 5/5 5/5  5/5  5/5  5/5   5/5  5/5  4+/5  4+/5  5/5  4+/5  L5 Great toe ext:  5/5  5/5  5/5  5/5  5/5  5/5  5/5  5/5  5/5  5/5  L4 Heel walk/DF:  5/5 5/5  5/5  5/5  5/5  5/5  5/5  5/5  5/5  5/5  S1 toe walk/PF/ever: 4+/5  4+/5  4+/5  4+/5  4/5  4/5  4/5  4/5  4/5  4/5  S2 knee flex:   4+/5  4+/5  4+/5  4+/5  4+/5  4+/5  4/5  4/5  4/5  4/5  Hip ER   5/5 5/5  5/5  5/5  5/5  5/5  4+/5  4+/5  4+/5  4+/5  Hip IR   5/5 5/5 5/5  5/5  5/5  5/5  5/5  5/5  4+/5  4+/5  Hip abd  5/5  5/5  5/5  5/5  5/5  5/5  4-/5  4-/5  4/5  4/5  Hip exten  4+/5  4+/5  4+/5  4+/5  4+/5  4+/5  4-/5  4-/5  3-/5  3+/5    LUMBAR AROM: 8/13/2024 7/8/2024 6/4/2024 4/29/2024 4/3/2024  Flexion   Mod  Mod   Mod  Mod  Mod robertson (FT mid shin)  Extension  Onel  Onel   Onel  Onel  onel 90% robertson  R sideglide  Mod  Mod   Mod  Mod  Mod 60% robertson  L sideglide  Mod  Mod   Mod  Onel  Onel 80% robertson      FUNCTION:  8/1/2024: Pt uses RW for longer distance outings for stability since thor fusion 2 years ago (does not bring to PT today) - reports due to occasional lightheadedness from his usual meds   No difficulty w/ bridges/bed mobility   No remaining LBP w/ getting up from prolonged sitting and w/ static standing > 10 min   No remaining Quad lag B/L    Demonstrates very mild flexed posture in standing (hip flexor tightness) -improved   Limited forward bending due to hamstring tightness  7/8/2024: Pt uses RW for outings for stability since thor fusion 2 years ago - reports due to occasional lightheadedness from his usual meds   No difficulty w/ bridges/bed mobility   No remaining LBP w/ getting up from prolonged sitting and w/ static standing > 10 min   Quad lag B/L (knee ext qset/SLR): not tested today   Demonstrates flexed posture in standing (hip flexor tightness) -improved   Limited forward bending due to hamstring tightness  6/4/2024: Pt uses RW for outings for stability since thor fusion 2 years ago - reports due to occasional lightheadedness from his usual  meds   no difficulty w/ bridges/bed mobility   No remaining LBP w/ getting up from prolonged sitting and w/ static standing > 10 min   Quad lag B/L (knee ext qset/SLR): not tested today   Demonstrates flexed posture in standing (hip flexor tightness)   Limited forward bending due to hamstring tightness  4/29/2024: Pt is limited with ambulation w/o AD - uses RW for outings for stability since thor fusion 2 years ago   Mod difficulty w/ bridges/bed mobility   LBP w/ getting up from prolonged sitting and w/ static standing > 10 min   Quad lag B/L (knee ext qset/SLR): R +2/-4; L 0/-4   Demonstrates flexed posture in standing (hip flexor tightness)   Limited forward bending due to hamstring tightness  4/3/2024: Pt is limited with ambulation w/o AD - uses RW for outings for stability since thor fusion 2 years ago   Mod difficulty w/ bridges/bed mobility   LBP w/ getting up from prolonged sitting and w/ static standing > 5 min   Quad lag B/L (knee ext qset/SLR): R +2/-4; L 0/-4   Demonstrates flexed posture in standing (hip flexor tightness)   Limited forward bending due to hamstring tightness      FLEXIBILITY:  8/13/2024: hams B/L 80 deg   Quads WNL B/L  7/8/2024:hamstring flexibility 85 deg B/L   Quads  B/L in prone  6/4/2024: hamstring flexibility 60 deg B/L   Quads WNL R 115; L 120 in prone  4/29/2024: hamsrting flexibility mod/tera robertson B/L 60 deg   Quads min robertson B/L prone knee flex R110/ L 120; hip flexors mod robertson B/L   4/3/2024: Hamstring flexibility tera robertson B/L 55 deg         Quad/hip flexor flexibitlity mod robertson R/min robertson L -  knee flex sup/prone R- 105/113; L 120/124        Pirif flexibility mod/tera robertson B/L       Outcome Measures Initial Eval  4/3/2024 RE  4/29/2024 RE   6/4/2024 RE   7/8/2024 8/12/2024  RE    5xSTS 35 sec minor use of hands 29.5 SEC min use hands; 7/10 fatigue 18 sec no use UE's 17.5 sec no use of UE's 16 sec    TUG 14.67 sec no AD 16 sec no AD 11.5 sec no AD 14.8 sec no AD  13.7 sec    10  meter NT sec =  NT m/s        CAMPOS NT/56        FGA 13/30 4/5/2024 13/30 18/30 20/30 24/30    mCTSIB  - FTEO (firm)  - FTEC (firm)  - FTEO (foam)  - FTEC (foam)   60+ sec  8 sec  60+ sec  3 sec   60+ sec  60+ sec  60+ sec  5 sec   60+sec  60+ sec  60+ sec  60+ sec   60+sec  60+ sec  60+ sec  60+ sec   60+ sec  60+ sec  60+ sec  30 sec    6MWT NT meters in NT min        SLS EO 1 sec R/L 1 sec R/L 2 sec R/L 5 sec R/ 8 sec L  8 sec R; 6 sec L    ABC Scale 58.13% w/ Rollator 56.88% w/ rollator 71.25% w/ rollator 70% w/ rollator  76.25% w/ rollator         Cut off scores:  All data taken from [facility] or Rehab Measures    Campos: <46/56=falls risk  MDC: 6 pts  Age Norms:  60-69: M - 55   F - 55  70-79: M - 54   F - 53  80-89: M - 53   F - 50 5xSTS: Natan et al 2010  MDC: 2.3 sec  Age Norms:  60-69: 11.1 sec  70-79: 12.6 sec  80-89: 14.8 sec   TUG  MDC: 4.14 sec  Cut off score:  >13.5 sec community dwelling adults  >32.2 sec frail elderly  <20 sec: I for basic transfers  >30: dependent for transfers 10 Meter Walk Test Norms: Ashley et al 2011  20-29: M - 1.35 m   F - 1.34 m  30-39: M - 1.43 m   F - 1.34 m  40-49: M - 1.43 m   F - 1.39 m  50-59: M - 1.43 m   F - 1.31 m  60-69: M - 1.34 m   F - 1.24 m  70-79: M - 1.26 m   F - 1.13 m  80-89: M - 0.97 m   F - 0.94 m   FGA  MCID: 4 pts  Geriatrics/community < 22/30 fall risk  Geriatrics/community < 20/30 unexplained falls DGI  MDC: vestibular - 4 pts  MDC: geriatric/community - 3 pts  Falls risk <19/24   6 Minute Walk Test  Age Norms  60-69: M - 1876 ft (571.80 m)  F - 1765 ft (537.98 m)  70-79: M - 1729 ft (527.00 m)  F - 1545 ft (470.92 m)  80-89: M - 1368 ft (416.97 m)  F - 1286 ft (391.97 m) mCTSIB  Norm: 20-60 yrs  Eyes open firm: norm sway 0.21-0.48  Eyes closed firm: norm sway 0.48-0.99  Eyes open foam: norm sway 0.38-0.71  Eyes closed foam: norm sway 0.70-2.22           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  SOC: 7/8/2024  Re:  "7/8/2024  POC EXP: 8/19/2024  DAILY TREATMENT LOG  date 7/8/2024  RE 7/11/2024 7/15/2024 7/25/2024 8/12/2024  RE   Visit #/auth 21 22 23 24 25   manual        ROM/MMT     TT 5'   Neuro Re-Ed 15' 15' 20' 20' 25'   FGA  EM     TT   ABC scale EM     TT   mCTSIB EM     TT   Sidestep no rail   RTB @ ankles 10'x2 ea R/L CS RTB @ ankles 10'x4 ea R/L CS RTB 2 ankles 10'x2 R/L S   Monster walk TB @ ankles   RTB 10'x4 CS RTB 10'x4 CS    Ambulat w/ HT  14'x2 horiz HT CS       Retro walk  14'x2 CS w/ HT       FT W/ EC  On foam 20\"x3      FT on foam w/ horiz HT  1x10 R/L       Low mat STS w/ feet on 2\" foam  STS from chair 1x10 Low mat 22\" w/ feet on 2\" foam CS 2x10 Low mat 21\" w/ feet on 2\" foam CS 2x10    Fwd step up non-recip to foam   6\" step (no foam) 1x10 R/L CGA no rail 6\" step (no foam) 1x10 R/L CGA no rail    Hurdles near rail   5 hurdles w/ CGA:   Fwd non-reciprocal w/ foam pad in middle   3 laps;   Lateral 2 laps R/L 4 hurdles; CS; recip fwd 4x; side step sideways 2x ea 4 hurdles; CS; recip fwd 4x; side step sideways 4x ea    [facility] taps     2x10 R/L CS no rail  1x10 R/L CS   Ther Ex 15' 20' 20' 20' 15'   Recumb bike  L2x5' 45-50 RPM L2x5' 45-50 RPM L 2x5' 45-50 RPM L2x5' 45-50 RPM L2x5' 45-60 RPM   Sup ham stretch w/ SOS 20\"x3 R/L  20\"x3 R/L  20\"x3 R/L 30\"x3 R/L  30\"x2 R/L   Hip flexor stretch off edge  Off edge 1'x1 R/L Off edge 1'x1 R/L Off edge 1'x1 R/L    LAQ   W/ red TB 2x10 Yellow loop around ankles 1x10 R/L Yellow loop around ankles 1x10 R/L    Upright leg press B/L 50# 2x10 B/L 50# 2x10 B/L 50# 2x10 B/L 50# 2x10 B/L 50# 2x10   Rockerboard PD w/ rail for B/L gastroc stretch stand at rail 20x CS 20x CS      Stand alt hip abd vs TB YTB mid shin 1x10 R/L        Stand alt hip exten YTB mid shin 1x10 R/L   RTB @ ankles 2x10 R/L RTB @ ankles 2x10 R/L RTB @ ankles 1x10 R/L   Monster walks   YTB 10'x2  RTB @ ankles 10'x4 CS RTB @ ankles 10'x4 CS RTB @ ankles 1x10 R/L   HEP  Update & review  Updated & review     Ther " Activity 15'  5' 5' 5'   bridges 2x10  W/ RTB 2x10 RTB 2x10 RTB 2x10   5xSTS; TUG EM    TT   Fwd step ups        Gait Training                        Modalities                        HEP:  Access Code: [email or web address]  URL: [email or web address]  Date: 07/15/2024  Prepared by: Mary Alice Espinosa    Exercises  - Modified Kenroy Stretch  - 1 x daily - 2 reps - 1 min hold  - Hooklying Hamstring Stretch with Strap  - 1 x daily - 30 reps - 3 hold  - Supine Bridge with Resistance Band  - 1 x daily - 7 x weekly - 1 sets - 10 reps  - Side Stepping with Resistance at Ankles and Counter Support  - 1 x daily - 7 x weekly - 2 sets  - Standing Hip Extension with Resistance at Ankles and Unilateral Counter Support  - 1 x daily - 7 x weekly - 2 sets - 10 reps  - Sit to Stand  - 1 x daily - 7 x weekly - 1 sets - 10 reps  - Sitting Knee Extension with Resistance  - 1 x daily - 7 x weekly - 2 sets - 10 reps  - Tandem Walking with Counter Support  - 1 x daily - 7 x weekly - 3 sets - 10 reps  - Forward Monster Walks  - 1 x daily - 7 x weekly - 3 sets - 10 reps  - Walking March  - 1 x daily - 7 x weekly - 3 sets - 10 reps

## 2024-08-15 RX ORDER — GABAPENTIN 300 MG/1
300 CAPSULE ORAL 3 TIMES DAILY
COMMUNITY
End: 2024-08-19

## 2024-08-15 RX ORDER — MIRTAZAPINE 45 MG/1
45 TABLET, FILM COATED ORAL
COMMUNITY
Start: 2024-07-08

## 2024-08-15 RX ORDER — ESCITALOPRAM OXALATE 20 MG/1
20 TABLET ORAL DAILY
COMMUNITY

## 2024-08-15 RX ORDER — FOLIC ACID 1 MG/1
1 TABLET ORAL DAILY
COMMUNITY
End: 2024-08-19 | Stop reason: ALTCHOICE

## 2024-08-15 RX ORDER — FERROUS SULFATE 325(65) MG
325 TABLET, DELAYED RELEASE (ENTERIC COATED) ORAL
COMMUNITY

## 2024-08-15 RX ORDER — FLUTICASONE FUROATE, UMECLIDINIUM BROMIDE AND VILANTEROL TRIFENATATE 100; 62.5; 25 UG/1; UG/1; UG/1
1 POWDER RESPIRATORY (INHALATION) DAILY
COMMUNITY
End: 2024-08-19 | Stop reason: SDUPTHER

## 2024-08-15 RX ORDER — SERTRALINE HYDROCHLORIDE 100 MG/1
100 TABLET, FILM COATED ORAL DAILY
COMMUNITY
Start: 2024-05-13

## 2024-08-15 RX ORDER — METOPROLOL SUCCINATE 25 MG/1
25 TABLET, EXTENDED RELEASE ORAL DAILY
COMMUNITY

## 2024-08-15 RX ORDER — ASPIRIN 81 MG/1
81 TABLET ORAL DAILY
COMMUNITY
End: 2024-08-19 | Stop reason: ALTCHOICE

## 2024-08-15 RX ORDER — ALBUTEROL SULFATE 90 UG/1
2 AEROSOL, METERED RESPIRATORY (INHALATION) 4 TIMES DAILY
COMMUNITY

## 2024-08-19 ENCOUNTER — OFFICE VISIT (OUTPATIENT)
Dept: ENDOCRINOLOGY | Facility: CLINIC | Age: 79
End: 2024-08-19
Payer: MEDICARE

## 2024-08-19 VITALS
DIASTOLIC BLOOD PRESSURE: 80 MMHG | WEIGHT: 206 LBS | OXYGEN SATURATION: 98 % | BODY MASS INDEX: 29.49 KG/M2 | HEART RATE: 103 BPM | SYSTOLIC BLOOD PRESSURE: 116 MMHG | HEIGHT: 70 IN

## 2024-08-19 DIAGNOSIS — E55.9 VITAMIN D DEFICIENCY: Primary | ICD-10-CM

## 2024-08-19 DIAGNOSIS — K59.00 CONSTIPATION, UNSPECIFIED CONSTIPATION TYPE: ICD-10-CM

## 2024-08-19 DIAGNOSIS — R53.83 OTHER FATIGUE: ICD-10-CM

## 2024-08-19 DIAGNOSIS — R79.89 ABNORMAL TSH: ICD-10-CM

## 2024-08-19 PROCEDURE — 99204 OFFICE O/P NEW MOD 45 MIN: CPT | Performed by: INTERNAL MEDICINE

## 2024-08-19 RX ORDER — LEVOTHYROXINE SODIUM 25 UG/1
25 TABLET ORAL DAILY
Qty: 90 TABLET | Refills: 1 | Status: SHIPPED | OUTPATIENT
Start: 2024-08-19

## 2024-08-19 NOTE — PATIENT INSTRUCTIONS
Please get labs done as ordered  Continue levothyroxine 25 mcg orally daily        Instructions on taking levothyroxine (thyroid medication)     Please take the medication on an empty stomach, at least 30 min before breakfast. If you are taking it at night, please take it a minimum of 4 hr after your last meal.   Separate your thyroid medication form any calcium or iron products by at least 4 hours.     Certain medications can interfere with the absorption and metabolism of thyroid hormone, this can alter your thyroid levels, make sure you check with your pharmacy about any drug interactions,  before starting any new medications.      If you miss a dose of thyroid hormone therapy, take it immediately upon noticing or take 2 pills the next day to make up the missed dose.

## 2024-08-19 NOTE — PROGRESS NOTES
Ambulatory Visit  Name: Juve Livingston      : 1945      MRN: [id number]  Encounter Provider: Carly Toussaint MD  Encounter Date: 2024   Encounter department: [facility] [place]    Assessment & Plan   1. Vitamin D deficiency  Assessment & Plan:  Last level in  was 21.   Takes ergocalciferol 50,000 a week currently.    Plan:  Recheck level to determine the need for additional vitamin D.  Orders:  -     Vitamin D 25 hydroxy; Future  2. Abnormal TSH  Assessment & Plan:  : TSH 6.85, started on levothyroxine by oncologist in .  Endorses fatigue and constipation, multifactorial due to cancer history, vitamin D deficiency and iron pill intake. Denies any other symptoms.   Some improvement in fatigue with levothyroxine  Some improvement in constipation post stopping iron pills.      Plan:   Continue levothyroxine 25 mcg fort now.  Repeat TSH.  Obtain T4 and anti-microsomal antibodies and antithyroglobulin antibodies.    Orders:  -     TSH, 3rd generation; Future  -     T4, free; Future  -     Anti-microsomal antibody; Future  -     Anti-thyroglobulin antibody; Future  -     levothyroxine (Levoxyl) 25 mcg tablet; Take 1 tablet (25 mcg total) by mouth daily  3. Other fatigue  -     TSH, 3rd generation; Future  -     T4, free; Future  -     Anti-microsomal antibody; Future  -     Anti-thyroglobulin antibody; Future  -     levothyroxine (Levoxyl) 25 mcg tablet; Take 1 tablet (25 mcg total) by mouth daily  4. Constipation, unspecified constipation type  -     TSH, 3rd generation; Future  -     T4, free; Future  -     Anti-microsomal antibody; Future  -     Anti-thyroglobulin antibody; Future  -     levothyroxine (Levoxyl) 25 mcg tablet; Take 1 tablet (25 mcg total) by mouth daily      History of Present Illness     Juve Livingston is a 79 y.o. male with multiple myeloma S/P CART therapy , COPD, HLD, Vitamin D deficiency who presents due to elevated TSH level. His  "TSH andt T4 has been normal in the past, 6/23 TSH was elevated around 6 and T4 level was not obtained at that time. Patient's oncologist started him on levothyroxine 25 mcg at the end of June. Patient main symptom was fatigue that is multifactorial and has been ongoing since cancer diagnosis, along with constipation which he associated with taking iron pills, he states it has improved since stopping the iron pills. He endorses feeling overall somewhat better since starting levothyroxine. He takes levothyroxine alone first thing in the morning and waits 40 minutes to an hour before breakfast or any other medications.    Review of Systems   Constitutional:  Positive for fatigue. Negative for activity change, appetite change and fever.   HENT:  Negative for trouble swallowing.    Respiratory:  Negative for shortness of breath.    Cardiovascular:  Negative for chest pain and leg swelling.   Gastrointestinal:  Positive for constipation. Negative for abdominal pain, diarrhea, nausea and vomiting.   Genitourinary:  Negative for dysuria.   Musculoskeletal:  Negative for arthralgias and myalgias.   Psychiatric/Behavioral:  Negative for confusion and sleep disturbance.        Objective     /80 (BP Location: Left arm, Patient Position: Sitting, Cuff Size: Large)   Pulse 103   Ht 5' 10\" (1.778 m)   Wt 93.4 kg (206 lb)   SpO2 98%   BMI 29.56 kg/m²     Physical Exam  Vitals reviewed.   Constitutional:       General: He is not in acute distress.     Appearance: Normal appearance. He is not ill-appearing.   HENT:      Head: Normocephalic and atraumatic.      Mouth/Throat:      Mouth: Mucous membranes are moist.      Pharynx: Oropharynx is clear.   Eyes:      General: No scleral icterus.     Extraocular Movements: Extraocular movements intact.      Conjunctiva/sclera: Conjunctivae normal.   Neck:      Thyroid: No thyroid mass, thyromegaly or thyroid tenderness.   Cardiovascular:      Rate and Rhythm: Normal rate and " regular rhythm.   Pulmonary:      Effort: Pulmonary effort is normal. No respiratory distress.      Breath sounds: Normal breath sounds. No stridor. No wheezing.   Abdominal:      General: Bowel sounds are normal. There is no distension.      Palpations: Abdomen is soft. There is no mass.      Tenderness: There is no abdominal tenderness.   Musculoskeletal:      Cervical back: Full passive range of motion without pain and normal range of motion. No edema or erythema.   Skin:     General: Skin is warm.   Neurological:      Mental Status: He is alert and oriented to person, place, and time.       Administrative Statements

## 2024-08-19 NOTE — ASSESSMENT & PLAN NOTE
Last level in 6/24 was 21.   Takes ergocalciferol 50,000 a week currently.    Plan:  Recheck level to determine the need for additional vitamin D.

## 2024-08-19 NOTE — ASSESSMENT & PLAN NOTE
6/24: TSH 6.85, started on levothyroxine by oncologist in 6/24.  Endorses fatigue and constipation, multifactorial due to cancer history, vitamin D deficiency and iron pill intake. Denies any other symptoms.   Some improvement in fatigue with levothyroxine  Some improvement in constipation post stopping iron pills.      Plan:   Continue levothyroxine 25 mcg fort now.  Repeat TSH.  Obtain T4 and anti-microsomal antibodies and antithyroglobulin antibodies.